# Patient Record
Sex: MALE | Race: WHITE | NOT HISPANIC OR LATINO | Employment: UNEMPLOYED | URBAN - METROPOLITAN AREA
[De-identification: names, ages, dates, MRNs, and addresses within clinical notes are randomized per-mention and may not be internally consistent; named-entity substitution may affect disease eponyms.]

---

## 2017-07-05 ENCOUNTER — APPOINTMENT (OUTPATIENT)
Dept: RADIOLOGY | Facility: MEDICAL CENTER | Age: 52
DRG: 175 | End: 2017-07-05
Attending: EMERGENCY MEDICINE
Payer: COMMERCIAL

## 2017-07-05 ENCOUNTER — HOSPITAL ENCOUNTER (INPATIENT)
Facility: MEDICAL CENTER | Age: 52
LOS: 4 days | DRG: 175 | End: 2017-07-09
Attending: EMERGENCY MEDICINE | Admitting: INTERNAL MEDICINE
Payer: COMMERCIAL

## 2017-07-05 ENCOUNTER — HOSPITAL ENCOUNTER (OUTPATIENT)
Dept: RADIOLOGY | Facility: MEDICAL CENTER | Age: 52
End: 2017-07-05

## 2017-07-05 ENCOUNTER — APPOINTMENT (OUTPATIENT)
Dept: RADIOLOGY | Facility: MEDICAL CENTER | Age: 52
DRG: 175 | End: 2017-07-05
Attending: INTERNAL MEDICINE
Payer: COMMERCIAL

## 2017-07-05 ENCOUNTER — RESOLUTE PROFESSIONAL BILLING HOSPITAL PROF FEE (OUTPATIENT)
Dept: MEDSURG UNIT | Facility: MEDICAL CENTER | Age: 52
End: 2017-07-05
Payer: COMMERCIAL

## 2017-07-05 DIAGNOSIS — I26.09 OTHER PULMONARY EMBOLISM WITH ACUTE COR PULMONALE, UNSPECIFIED CHRONICITY (HCC): ICD-10-CM

## 2017-07-05 DIAGNOSIS — I21.A1: ICD-10-CM

## 2017-07-05 DIAGNOSIS — J96.01 ACUTE HYPOXEMIC RESPIRATORY FAILURE (HCC): ICD-10-CM

## 2017-07-05 DIAGNOSIS — N17.9 AKI (ACUTE KIDNEY INJURY) (HCC): ICD-10-CM

## 2017-07-05 DIAGNOSIS — I26.09 OTHER ACUTE PULMONARY EMBOLISM WITH ACUTE COR PULMONALE (HCC): ICD-10-CM

## 2017-07-05 DIAGNOSIS — E87.6 HYPOKALEMIA: ICD-10-CM

## 2017-07-05 PROBLEM — I26.99 PULMONARY EMBOLISM (HCC): Status: ACTIVE | Noted: 2017-07-05

## 2017-07-05 LAB
ABO GROUP BLD: NORMAL
APTT PPP: 25.3 SEC (ref 24.7–36)
BLD GP AB SCN SERPL QL: NORMAL
BNP SERPL-MCNC: 184 PG/ML (ref 0–100)
INR PPP: 1.12 (ref 0.87–1.13)
PROTHROMBIN TIME: 14.8 SEC (ref 12–14.6)
RH BLD: NORMAL
TROPONIN I SERPL-MCNC: 0.69 NG/ML (ref 0–0.04)

## 2017-07-05 PROCEDURE — 700117 HCHG RX CONTRAST REV CODE 255: Performed by: EMERGENCY MEDICINE

## 2017-07-05 PROCEDURE — 94760 N-INVAS EAR/PLS OXIMETRY 1: CPT

## 2017-07-05 PROCEDURE — 700105 HCHG RX REV CODE 258: Performed by: EMERGENCY MEDICINE

## 2017-07-05 PROCEDURE — 71275 CT ANGIOGRAPHY CHEST: CPT

## 2017-07-05 PROCEDURE — 86850 RBC ANTIBODY SCREEN: CPT

## 2017-07-05 PROCEDURE — 83880 ASSAY OF NATRIURETIC PEPTIDE: CPT

## 2017-07-05 PROCEDURE — 37212 THROMBOLYTIC VENOUS THERAPY: CPT

## 2017-07-05 PROCEDURE — 700111 HCHG RX REV CODE 636 W/ 250 OVERRIDE (IP): Performed by: EMERGENCY MEDICINE

## 2017-07-05 PROCEDURE — 84484 ASSAY OF TROPONIN QUANT: CPT | Mod: 91

## 2017-07-05 PROCEDURE — 85730 THROMBOPLASTIN TIME PARTIAL: CPT

## 2017-07-05 PROCEDURE — 96376 TX/PRO/DX INJ SAME DRUG ADON: CPT

## 2017-07-05 PROCEDURE — 770022 HCHG ROOM/CARE - ICU (200)

## 2017-07-05 PROCEDURE — 93005 ELECTROCARDIOGRAM TRACING: CPT | Performed by: EMERGENCY MEDICINE

## 2017-07-05 PROCEDURE — 73564 X-RAY EXAM KNEE 4 OR MORE: CPT | Mod: RT

## 2017-07-05 PROCEDURE — 85610 PROTHROMBIN TIME: CPT

## 2017-07-05 PROCEDURE — 99291 CRITICAL CARE FIRST HOUR: CPT

## 2017-07-05 PROCEDURE — 96374 THER/PROPH/DIAG INJ IV PUSH: CPT

## 2017-07-05 PROCEDURE — 700105 HCHG RX REV CODE 258: Performed by: INTERNAL MEDICINE

## 2017-07-05 PROCEDURE — 86901 BLOOD TYPING SEROLOGIC RH(D): CPT

## 2017-07-05 PROCEDURE — 700111 HCHG RX REV CODE 636 W/ 250 OVERRIDE (IP): Performed by: INTERNAL MEDICINE

## 2017-07-05 PROCEDURE — 3E03317 INTRODUCTION OF OTHER THROMBOLYTIC INTO PERIPHERAL VEIN, PERCUTANEOUS APPROACH: ICD-10-PCS | Performed by: EMERGENCY MEDICINE

## 2017-07-05 PROCEDURE — 96361 HYDRATE IV INFUSION ADD-ON: CPT

## 2017-07-05 PROCEDURE — 96375 TX/PRO/DX INJ NEW DRUG ADDON: CPT

## 2017-07-05 PROCEDURE — 86900 BLOOD TYPING SEROLOGIC ABO: CPT

## 2017-07-05 RX ORDER — ENALAPRILAT 1.25 MG/ML
1.25 INJECTION INTRAVENOUS EVERY 6 HOURS PRN
Status: DISCONTINUED | OUTPATIENT
Start: 2017-07-05 | End: 2017-07-09 | Stop reason: HOSPADM

## 2017-07-05 RX ORDER — PROMETHAZINE HYDROCHLORIDE 25 MG/1
12.5-25 TABLET ORAL EVERY 4 HOURS PRN
Status: DISCONTINUED | OUTPATIENT
Start: 2017-07-05 | End: 2017-07-09 | Stop reason: HOSPADM

## 2017-07-05 RX ORDER — SODIUM CHLORIDE 9 MG/ML
INJECTION, SOLUTION INTRAVENOUS CONTINUOUS
Status: DISCONTINUED | OUTPATIENT
Start: 2017-07-05 | End: 2017-07-06

## 2017-07-05 RX ORDER — POLYETHYLENE GLYCOL 3350 17 G/17G
1 POWDER, FOR SOLUTION ORAL
Status: DISCONTINUED | OUTPATIENT
Start: 2017-07-05 | End: 2017-07-09 | Stop reason: HOSPADM

## 2017-07-05 RX ORDER — MORPHINE SULFATE 4 MG/ML
1 INJECTION, SOLUTION INTRAMUSCULAR; INTRAVENOUS ONCE
Status: COMPLETED | OUTPATIENT
Start: 2017-07-05 | End: 2017-07-05

## 2017-07-05 RX ORDER — ACETAMINOPHEN 325 MG/1
650 TABLET ORAL EVERY 6 HOURS PRN
Status: DISCONTINUED | OUTPATIENT
Start: 2017-07-05 | End: 2017-07-09 | Stop reason: HOSPADM

## 2017-07-05 RX ORDER — AMOXICILLIN 250 MG
2 CAPSULE ORAL 2 TIMES DAILY
Status: DISCONTINUED | OUTPATIENT
Start: 2017-07-06 | End: 2017-07-09 | Stop reason: HOSPADM

## 2017-07-05 RX ORDER — PROMETHAZINE HYDROCHLORIDE 25 MG/1
12.5-25 SUPPOSITORY RECTAL EVERY 4 HOURS PRN
Status: DISCONTINUED | OUTPATIENT
Start: 2017-07-05 | End: 2017-07-09 | Stop reason: HOSPADM

## 2017-07-05 RX ORDER — HEPARIN SODIUM 1000 [USP'U]/ML
4000 INJECTION, SOLUTION INTRAVENOUS; SUBCUTANEOUS PRN
Status: DISCONTINUED | OUTPATIENT
Start: 2017-07-05 | End: 2017-07-07

## 2017-07-05 RX ORDER — SODIUM CHLORIDE 9 MG/ML
1000 INJECTION, SOLUTION INTRAVENOUS ONCE
Status: COMPLETED | OUTPATIENT
Start: 2017-07-05 | End: 2017-07-05

## 2017-07-05 RX ORDER — BISACODYL 10 MG
10 SUPPOSITORY, RECTAL RECTAL
Status: DISCONTINUED | OUTPATIENT
Start: 2017-07-05 | End: 2017-07-09 | Stop reason: HOSPADM

## 2017-07-05 RX ORDER — OXYCODONE HYDROCHLORIDE 5 MG/1
5 TABLET ORAL EVERY 4 HOURS PRN
Status: DISCONTINUED | OUTPATIENT
Start: 2017-07-05 | End: 2017-07-09 | Stop reason: HOSPADM

## 2017-07-05 RX ORDER — MORPHINE SULFATE 4 MG/ML
2-4 INJECTION, SOLUTION INTRAMUSCULAR; INTRAVENOUS EVERY 4 HOURS PRN
Status: DISCONTINUED | OUTPATIENT
Start: 2017-07-05 | End: 2017-07-07

## 2017-07-05 RX ORDER — ONDANSETRON 2 MG/ML
4 INJECTION INTRAMUSCULAR; INTRAVENOUS ONCE
Status: COMPLETED | OUTPATIENT
Start: 2017-07-05 | End: 2017-07-05

## 2017-07-05 RX ORDER — GUAIFENESIN/DEXTROMETHORPHAN 100-10MG/5
10 SYRUP ORAL EVERY 6 HOURS PRN
Status: DISCONTINUED | OUTPATIENT
Start: 2017-07-05 | End: 2017-07-09 | Stop reason: HOSPADM

## 2017-07-05 RX ORDER — IPRATROPIUM BROMIDE AND ALBUTEROL SULFATE 2.5; .5 MG/3ML; MG/3ML
3 SOLUTION RESPIRATORY (INHALATION)
Status: DISCONTINUED | OUTPATIENT
Start: 2017-07-05 | End: 2017-07-09 | Stop reason: HOSPADM

## 2017-07-05 RX ORDER — SODIUM CHLORIDE 9 MG/ML
50 INJECTION, SOLUTION INTRAVENOUS CONTINUOUS
Status: ACTIVE | OUTPATIENT
Start: 2017-07-05 | End: 2017-07-05

## 2017-07-05 RX ORDER — ONDANSETRON 4 MG/1
4 TABLET, ORALLY DISINTEGRATING ORAL EVERY 4 HOURS PRN
Status: DISCONTINUED | OUTPATIENT
Start: 2017-07-05 | End: 2017-07-09 | Stop reason: HOSPADM

## 2017-07-05 RX ORDER — OXYCODONE HYDROCHLORIDE 10 MG/1
10 TABLET ORAL EVERY 4 HOURS PRN
Status: DISCONTINUED | OUTPATIENT
Start: 2017-07-05 | End: 2017-07-09 | Stop reason: HOSPADM

## 2017-07-05 RX ORDER — ONDANSETRON 2 MG/ML
4 INJECTION INTRAMUSCULAR; INTRAVENOUS EVERY 4 HOURS PRN
Status: DISCONTINUED | OUTPATIENT
Start: 2017-07-05 | End: 2017-07-09 | Stop reason: HOSPADM

## 2017-07-05 RX ADMIN — ALTEPLASE 100 MG: KIT at 17:18

## 2017-07-05 RX ADMIN — SODIUM CHLORIDE: 9 INJECTION, SOLUTION INTRAVENOUS at 19:20

## 2017-07-05 RX ADMIN — IOHEXOL 100 ML: 350 INJECTION, SOLUTION INTRAVENOUS at 16:25

## 2017-07-05 RX ADMIN — HEPARIN SODIUM 1000 UNITS/HR: 5000 INJECTION, SOLUTION INTRAVENOUS at 19:57

## 2017-07-05 RX ADMIN — SODIUM CHLORIDE 1000 ML: 9 INJECTION, SOLUTION INTRAVENOUS at 15:30

## 2017-07-05 RX ADMIN — ONDANSETRON 4 MG: 2 INJECTION INTRAMUSCULAR; INTRAVENOUS at 17:25

## 2017-07-05 RX ADMIN — MORPHINE SULFATE 4 MG: 4 INJECTION INTRAVENOUS at 23:16

## 2017-07-05 RX ADMIN — HEPARIN SODIUM 4000 UNITS: 1000 INJECTION, SOLUTION INTRAVENOUS; SUBCUTANEOUS at 19:55

## 2017-07-05 RX ADMIN — MORPHINE SULFATE 1 MG: 4 INJECTION INTRAVENOUS at 17:25

## 2017-07-05 RX ADMIN — MORPHINE SULFATE 3 MG: 4 INJECTION INTRAVENOUS at 18:24

## 2017-07-05 RX ADMIN — SODIUM CHLORIDE 50 ML: 9 INJECTION, SOLUTION INTRAVENOUS at 19:20

## 2017-07-05 ASSESSMENT — PAIN SCALES - GENERAL
PAINLEVEL_OUTOF10: 3
PAINLEVEL_OUTOF10: 3
PAINLEVEL_OUTOF10: 6

## 2017-07-05 ASSESSMENT — ENCOUNTER SYMPTOMS
WHEEZING: 0
ABDOMINAL PAIN: 0
NAUSEA: 1
DIZZINESS: 0
COUGH: 1
CONSTIPATION: 0
SPUTUM PRODUCTION: 1
SHORTNESS OF BREATH: 1
FEVER: 0
DIARRHEA: 0
LOSS OF CONSCIOUSNESS: 0
HEADACHES: 0
FOCAL WEAKNESS: 0
BLURRED VISION: 0
CHILLS: 0
VOMITING: 0

## 2017-07-05 ASSESSMENT — COPD QUESTIONNAIRES
DO YOU EVER COUGH UP ANY MUCUS OR PHLEGM?: YES, A FEW DAYS A WEEK OR MONTH
HAVE YOU SMOKED AT LEAST 100 CIGARETTES IN YOUR ENTIRE LIFE: YES
COPD SCREENING SCORE: 5
DURING THE PAST 4 WEEKS HOW MUCH DID YOU FEEL SHORT OF BREATH: SOME OF THE TIME

## 2017-07-05 ASSESSMENT — LIFESTYLE VARIABLES
SUBSTANCE_ABUSE: 0
EVER_SMOKED: YES

## 2017-07-05 NOTE — ED NOTES
Pt was up to BS and had a BM. O2 delivery switched to oxymask at 10L because Pt is breathing mostly through his mouth. To CT via [place] at this time.

## 2017-07-05 NOTE — IP AVS SNAPSHOT
7/9/2017    Thony Crow   [address]  [place] [address]    Dear Thony:    [facility] wants to ensure your discharge home is safe and you or your loved ones have had all of your questions answered regarding your care after you leave the hospital.    Below is a list of resources and contact information should you have any questions regarding your hospital stay, follow-up instructions, or active medical symptoms.    Questions or Concerns Regarding… Contact   Medical Questions Related to Your Discharge  (7 days a week, 8am-5pm) Contact a Nurse Care Coordinator   [phone]   Medical Questions Not Related to Your Discharge  (24 hours a day / 7 days a week)  Contact the Nurse Health Line   [phone]    Medications or Discharge Instructions Refer to your discharge packet   or contact your [facility] Primary Care Provider   [phone]   Follow-up Appointment(s) Schedule your appointment via [employer]   or contact Scheduling [phone]   Billing Review your statement via [employer]  or contact Billing [phone]   Medical Records Review your records via [employer]   or contact Medical Records [phone]     You may receive a telephone call within two days of discharge. This call is to make certain you understand your discharge instructions and have the opportunity to have any questions answered. You can also easily access your medical information, test results and upcoming appointments via the [employer] free online health management tool. You can learn more and sign up at [employer]. For assistance setting up your [employer] account, please call [phone].    Once again, we want to ensure your discharge home is safe and that you have a clear understanding of any next steps in your care. If you have any questions or concerns, please do not hesitate to contact us, we are here for you. Thank you for choosing [facility] for your healthcare needs.    Sincerely,    Your [facility] Healthcare Team

## 2017-07-05 NOTE — H&P
[facility] Medicine Admitting History and Physical    Name Thony Crow     1965   Age/Sex 52 y.o. male   MRN [id number]   Code Status FULL     After 5PM or if no immediate response to page, please call for cross-coverage  Attending/Team: Dr. Brady/[employer] Text to page 6AM-5PM  Call [phone] to page after hours   1st Call - Day Sr. Resident (R2/R3):   KYLAH Hernandez 2nd Call - Day Sr. Resident (R2/R3):   BANG Peng         Chief Complaint:  Chest pain    HPI:  Mr. Crow is a 53 y/o male with no prior PMHx who presented to [facility] with complaints of sudden onset chest pain at 10 AM today.  He has a h/o remote R ACL repair and then re-tearing it 20 years ago, and last week he injured the same knee while lifting something heavy and was immobilized due to pain.  The next day he noted swelling of his entire RLE from thigh to foot, and continued to be bedridden due to this.  For the past 2 days, the swelling has been decreasing, and yesterday the patient was able to go camping.  This morning when he was packing up his stuff, he developed sudden onset of diaphoresis and substernal chest pressure with extreme dyspnea and dizziness and called 911 when this did not relieve after a few minutes.  He was taken to [facility] where his trop was noted to be 0.9 and he was hypoxic requiring 15 and then later 8-10 L/min O2.  He was given  mg and 2 nitro tabs, but then his BP dropped, so he was transferred here.  Both outside EKG and EKG here showed S1Q3T3 pattern on EKG, and CTPE here showed bilateral diffuse PE.  He was given full dose tPA in ER and is being admitted to ICU.  Patient states his pain is down from 8/10 to about 4-5/10 at this point.    Review of Systems   Constitutional: Positive for malaise/fatigue. Negative for fever and chills.   Eyes: Negative for blurred vision.   Respiratory: Positive for cough, sputum production and shortness of  breath. Negative for wheezing.    Cardiovascular: Positive for chest pain.   Gastrointestinal: Positive for nausea. Negative for vomiting, abdominal pain, diarrhea and constipation.   Genitourinary: Negative for dysuria.   Musculoskeletal:        + right knee pain   Neurological: Negative for dizziness, focal weakness, loss of consciousness and headaches.   Psychiatric/Behavioral: Negative for substance abuse.   All other systems reviewed and are negative.            Past Medical History:   History reviewed. No pertinent past medical history.    Past Surgical History:  Past Surgical History   Procedure Laterality Date   • Other orthopedic surgery  1991     ACL repair R knee    • Other orthopedic surgery  2010     meniscus repair L knee       Current Outpatient Medications:  Home Medications     Reviewed by Terrence Alcocer (Pharmacy Tech) on 07/05/17 at 1658  Med List Status: Complete    Medication Last Dose Status          Patient Abel Taking any Medications                        Medication Allergy/Sensitivities:  No Known Allergies    Family History:  Family History   Problem Relation Age of Onset   • Diabetes Father        Social History:  Social History     Social History   • Marital Status: Single     Spouse Name: N/A   • Number of Children: N/A   • Years of Education: N/A     Occupational History   • Not on file.     Social History Main Topics   • Smoking status: Current Some Day Smoker     Types: Cigars   • Smokeless tobacco: Not on file   • Alcohol Use: No      Comment: rarely   • Drug Use: Yes     Special: Inhaled      Comment: regular marijuana use   • Sexual Activity: Not on file     Other Topics Concern   • Not on file     Social History Narrative   • No narrative on file       Living situation: lives in [place]  PCP : none       Physical Exam   Filed Vitals:    07/05/17 1718 07/05/17 1733 07/05/17 1748 07/05/17 1803   Pulse: 94 88 98 83   Temp:       Resp:       Height:       Weight:       SpO2:  "98% 97% 98% 97%     Body mass index is 32.66 kg/(m^2).  Pulse 83  Temp(Src) 37.3 °C (99.1 °F)  Resp 28  Ht 2.007 m (6' 7\")  Wt 131.543 kg (290 lb)  BMI 32.66 kg/m2  SpO2 97%  O2 therapy: Pulse Oximetry: 97 %, O2 (LPM): 8, O2 Delivery: Oxymask    Physical Exam   Constitutional: He is oriented to person, place, and time and well-developed, well-nourished, and in no distress.   HENT:   Head: Normocephalic and atraumatic.   Mouth/Throat: Oropharynx is clear and moist.   Eyes: EOM are normal. Pupils are equal, round, and reactive to light.   Neck: Normal range of motion. Neck supple. No JVD present. No tracheal deviation present.   Cardiovascular: Normal rate, regular rhythm, normal heart sounds and intact distal pulses.  Exam reveals no gallop and no friction rub.    No murmur heard.  Pulmonary/Chest: He is in respiratory distress. He has wheezes. He has no rales.   Abdominal: Soft. Bowel sounds are normal. He exhibits no distension. There is no tenderness.   Musculoskeletal: Normal range of motion.   Mild RLE edema. + tenderness over R knee   Neurological: He is alert and oriented to person, place, and time. GCS score is 15.   No focal deficits   Skin: Skin is warm and dry. No rash noted. No erythema.   Psychiatric: Mood and affect normal.   Nursing note and vitals reviewed.         Data Review       Old Records Request:   Completed  Current Records review and summary: Completed    Outside Labs:  Trop 0.9  CMP wnl aside from creat of 1.39 and glucose 139  EKG sinus tachycardia with S1Q3T3  CXR shows pulmonary congestion    Lab Data Review:  Recent Results (from the past 24 hour(s))   EKG (ER)    Collection Time: 07/05/17  3:17 PM   Result Value Ref Range    Report       [facility] Emergency Dept.    Test Date:  2017-07-05  Pt Name:    CAROL LOPEZ                 Department: ER  MRN:        [id number]                      Room:        16  Gender:     M                            Technician: " [id number]  :        1965                   Requested By:CIELO OLIVARES  Order #:    [id number]                    Reading MD:    Measurements  Intervals                                Axis  Rate:       87                           P:          71  MD:         168                          QRS:        29  QRSD:       112                          T:          -15  QT:         388  QTc:        467    Interpretive Statements  SINUS RHYTHM  NONSPECIFIC INTRAVENTRICULAR CONDUCTION DELAY  No previous ECG available for comparison     TROPONIN    Collection Time: 17  3:23 PM   Result Value Ref Range    Troponin I 0.69 (H) 0.00 - 0.04 ng/mL   BTYPE NATRIURETIC PEPTIDE    Collection Time: 17  3:23 PM   Result Value Ref Range    B Natriuretic Peptide 184 (H) 0 - 100 pg/mL   Prothrombin Time    Collection Time: 17  3:23 PM   Result Value Ref Range    PT 14.8 (H) 12.0 - 14.6 sec    INR 1.12 0.87 - 1.13   APTT    Collection Time: 17  3:23 PM   Result Value Ref Range    APTT 25.3 24.7 - 36.0 sec       Imaging/Procedures Review:    Independant Imaging Review: Completed  CT-CTA CHEST PULMONARY ARTERY W/ RECONS   Final Result   Addendum 1 of 1   These findings were discussed with CIELO OLIVARES on 2017 4:41 PM.      Final      1.  RIGHT ventricular strain extensive acute bilateral pulmonary emboli with findings raising concern for RIGHT ventricular strain.   2.  No pneumonia or pneumothorax.               OUTSIDE IMAGES-DX CHEST   Preliminary Result      LE Venous Duplex-DVT ([facility] only)    (Results Pending)   ECHOCARDIOGRAM-COMP W/ CONT    (Results Pending)   DX-KNEE COMPLETE 4+ RIGHT    (Results Pending)        EKG:   EKG Independant Review: Completed  QTc:467 ms, HR: 87 bpm, Normal Sinus Rhythm with S1Q3T3 changes noted.    (y) Records reviewed and summarized in current documentation       Assessment/Plan     * Pulmonary embolism (CMS-HCC) (present on admission)  Assessment &  Plan  pt presented with sudden onset chest pain to outside facility; has h/o recent immobility after ACL injury with h/o LE pain and swelling which resolved recently; EKG shows S1Q3T3 changes; CXR from outside facility shows mild pulmonary congestion; ; CTPE shows extensive acute b/l PEs with e/o RV strain; full dose tPA administered in ER; will admit to ICU for monitoring post-tPA; LE duplex ordered to evaluate for DVT; start heparin gtt post-tpa and will need to transition to oral anticoagulation 24 hours post-tPA    Non-ST elevation myocardial infarction (NSTEMI) due to mismatch of myocardial oxygen supply and demand (CMS-HCC) (present on admission)  Assessment & Plan  2/2 demand ischemia from PE; trop trended up from outside facility; EKG shows NSR with S1Q3T3; will trend trop to monitor for improvement with treatment of PE    Acute hypoxemic respiratory failure (CMS-HCC) (present on admission)  Assessment & Plan  2/2 PE; wheezing noted on exam; continue O2 by oxymask; RT protocol; Q6H duonebs    MARJAN (acute kidney injury) (CMS-HCC) (present on admission)  Assessment & Plan  Creat 1.39 in [place]; likely prerenal; will hydrate    Hypokalemia (present on admission)  Assessment & Plan  K 3.8 in [place]; replete prn        Anticipated Hospital stay:  >2 midnights      Quality Measures  EKG reviewed, Labs reviewed, Medications reviewed and Radiology images reviewed  Sanchez catheter: No Sanchez      DVT Prophylaxis: Contraindicated - High bleeding risk  DVT prophylaxis - mechanical: Contra-indicated  Ulcer prophylaxis: Not indicated

## 2017-07-05 NOTE — IP AVS SNAPSHOT
[employer] Access Code: [email or web address]  Expires: 8/8/2017  2:31 PM    Your email address is not on file at [facility].  Email Addresses are required for you to sign up for [employer], please contact [phone] to verify your personal information and to provide your email address prior to attempting to register for [employer].    Thony BINA Tristin   [address]  [place] [address]    [employer]  A secure, online tool to manage your health information     [employer]’s [employer]® is a secure, online tool that connects you to your personalized health information from the privacy of your home -- day or night - making it very easy for you to manage your healthcare. Once the activation process is completed, you can even access your medical information using the [employer] charity, which is available for free in the [employer] store or Google Play store.     To learn more about [employer], visit [email or web address]    There are two levels of access available (as shown below):   My Chart Features  [facility] Primary Care Doctor [facility]  Specialists [facility]  Urgent  Care Non-[facility] Primary Care Doctor   Email your healthcare team securely and privately 24/7 X X X    Manage appointments: schedule your next appointment; view details of past/upcoming appointments X      Request prescription refills. X      View recent personal medical records, including lab and immunizations X X X X   View health record, including health history, allergies, medications X X X X   Read reports about your outpatient visits, procedures, consult and ER notes X X X X   See your discharge summary, which is a recap of your hospital and/or ER visit that includes your diagnosis, lab results, and care plan X X  X     How to register for [employer]:  Once your e-mail address has been verified, follow the following steps to sign up for [employer].     1. Go to  [email or web address]  2. Click on the Sign Up Now box, which takes you to the New Member Sign Up page. You  will need to provide the following information:  a. Enter your [employer] Access Code exactly as it appears at the top of this page. (You will not need to use this code after you’ve completed the sign-up process. If you do not sign up before the expiration date, you must request a new code.)   b. Enter your date of birth.   c. Enter your home email address.   d. Click Submit, and follow the next screen’s instructions.  3. Create a [employer] ID. This will be your [employer] login ID and cannot be changed, so think of one that is secure and easy to remember.  4. Create a [employer] password. You can change your password at any time.  5. Enter your Password Reset Question and Answer. This can be used at a later time if you forget your password.   6. Enter your e-mail address. This allows you to receive e-mail notifications when new information is available in [employer].  7. Click Sign Up. You can now view your health information.    For assistance activating your [employer] account, call [phone]

## 2017-07-05 NOTE — IP AVS SNAPSHOT
" Home Care Instructions                                                                                                                  Name:Thony Crow  Medical Record Number:[id number]  CSN: [id number]    YOB: 1965   Age: 52 y.o.  Sex: male  HT:2.007 m (6' 7.02\") WT: 134.6 kg (296 lb 11.8 oz)          Admit Date: 7/5/2017     Discharge Date:   Today's Date: 7/9/2017  Attending Doctor:  John Welch M.D.                  Allergies:  Review of patient's allergies indicates no known allergies.            Discharge Instructions       Discharge Instructions    Discharged to home by car with self. Discharged via wheelchair, hospital escort: Yes.  Special equipment needed: Not Applicable    Be sure to schedule a follow-up appointment with your primary care doctor or any specialists as instructed.     Discharge Plan:   Diet Plan: Discussed  Activity Level: Discussed  Confirmed Follow up Appointment: Patient to Call and Schedule Appointment  Confirmed Symptoms Management: Discussed  Medication Reconciliation Updated: Yes  Influenza Vaccine Indication: Indicated: Not available from distributor/    I understand that a diet low in cholesterol, fat, and sodium is recommended for good health. Unless I have been given specific instructions below for another diet, I accept this instruction as my diet prescription.   Other diet: regular    Special Instructions:   Rivaroxaban oral tablets  What is this medicine?  RIVAROXABAN (ri va LACY a ban) is an anticoagulant (blood thinner). It is used to treat blood clots in the lungs or in the veins. It is also used after knee or hip surgeries to prevent blood clots. It is also used to lower the chance of stroke in people with a medical condition called atrial fibrillation.  This medicine may be used for other purposes; ask your health care provider or pharmacist if you have questions.  COMMON BRAND NAME(S): Xarelto  What should I tell my health care provider before " I take this medicine?  They need to know if you have any of these conditions:  -bleeding disorders  -bleeding in the brain  -blood in your stools (black or tarry stools) or if you have blood in your vomit  -history of stomach bleeding  -kidney disease  -liver disease  -low blood counts, like low white cell, platelet, or red cell counts  -recent or planned spinal or epidural procedure  -take medicines that treat or prevent blood clots  -an unusual or allergic reaction to rivaroxaban, other medicines, foods, dyes, or preservatives  -pregnant or trying to get pregnant  -breast-feeding  How should I use this medicine?  Take this medicine by mouth with a glass of water. Follow the directions on the prescription label. Take your medicine at regular intervals. Do not take it more often than directed. Do not stop taking except on your doctor's advice.  If you are taking this medicine after hip or knee replacement surgery, take it with or without food.  If you are taking this medicine for atrial fibrillation, take it with your evening meal. If you are taking this medicine to treat blood clots, take it with food at the same time each day. If you are unable to swallow your tablet, you may crush the tablet and mix it in applesauce. Then, immediately eat the applesauce. You should eat more food right after you eat the applesauce containing the crushed tablet.  Talk to your pediatrician regarding the use of this medicine in children. Special care may be needed.  Overdosage: If you think you've taken too much of this medicine contact a poison control center or emergency room at once.  Overdosage: If you think you have taken too much of this medicine contact a poison control center or emergency room at once.  NOTE: This medicine is only for you. Do not share this medicine with others.  What if I miss a dose?  If you take your medicine once a day and miss a dose, take the missed dose as soon as you remember. If you take your  medicine twice a day and miss a dose, take the missed dose immediately. In this instance, 2 tablets may be taken at the same time. The next day you should take 1 tablet twice a day as directed.  What may interact with this medicine?  -aspirin and aspirin-like medicines  -certain antibiotics like erythromycin, azithromycin, and clarithromycin  -certain medicines for fungal infections like ketoconazole and itraconazole  -certain medicines for irregular heart beat like amiodarone, quinidine, dronedarone  -certain medicines for seizures like carbamazepine, phenytoin  -certain medicines that treat or prevent blood clots like warfarin, enoxaparin, and dalteparin   -conivaptan  -diltiazem  -felodipine  -indinavir  -lopinavir; ritonavir  -NSAIDS, medicines for pain and inflammation, like ibuprofen or naproxen  -ranolazine  -rifampin  -ritonavir  -Imke's wort  -verapamil  This list may not describe all possible interactions. Give your health care provider a list of all the medicines, herbs, non-prescription drugs, or dietary supplements you use. Also tell them if you smoke, drink alcohol, or use illegal drugs. Some items may interact with your medicine.  What should I watch for while using this medicine?  Do not stop taking this medicine without first talking to your doctor. Stopping this medicine may increase your risk of having a stroke. Be sure to refill your prescription before you run out of medicine.  This medicine may increase your risk to bruise or bleed. Call your doctor or health care professional if you notice any unusual bleeding.  Be careful brushing and flossing your teeth or using a toothpick because you may bleed more easily. If you have any dental work done, tell your dentist you are receiving this medicine.  What side effects may I notice from receiving this medicine?  Side effects that you should report to your doctor or health care professional as soon as possible:  -allergic reactions like skin rash,  itching or hives, swelling of the face, lips, or tongue  -back pain  -bloody or black, tarry stools  -changes in vision  -confusion, trouble speaking or understanding  -red or dark-brown urine  -redness, blistering, peeling or loosening of the skin, including inside the mouth  -severe headaches  -spitting up blood or brown material that looks like coffee grounds  -sudden numbness or weakness of the face, arm or leg  -trouble walking, dizziness, loss of balance or coordination  -unusual bruising or bleeding from the eye, gums, or nose   Side effects that usually do not require medical attention (Report these to your doctor or health care professional if they continue or are bothersome.):  -dizziness  -muscle pain  This list may not describe all possible side effects. Call your doctor for medical advice about side effects. You may report side effects to FDA at 9-363-FDA-5342.  Where should I keep my medicine?  Keep out of the reach of children.  Store at room temperature between 15 and 30 degrees C (59 and 86 degrees F). Throw away any unused medicine after the expiration date.      · Is patient Post Blood Transfusion?  No  Deep Vein Thrombosis Discharge Instructions    A deep vein thrombosis (DVT) is a blood clot (thrombus) that develops in a deep vein. A DVT is a clot in the deep, larger veins of the leg, arm, or pelvis. These are more dangerous than clots that might form in veins on the surface of the body. Deep vein thrombosis can lead to complications if the clot breaks off and travels in the bloodstream to the lungs.     CAUSES  Blood clots form in a vein for different reasons. Usually several things cause blood clots. They include:   · The flow of blood slows down.   · The inside of the vein is damaged in some way.   · The person has a condition that makes blood clot more easily. These conditions may include:  · Older age (especially over 75 years old).  · Having a history of blood clots.  · Having major or  lengthy surgery. Hip surgery is particularly high-risk.   · Breaking a hip or leg.  · Sitting or lying still for a long time.  · Cancer or cancer treatment.  · Having a long, thin tube (catheter) placed inside a vein during a medical procedure.   · Being overweight (obese).  · Pregnancy and childbirth.  · Medicines with estrogen.  · Smoking.  · Other circulation or heart problems.     SYMPTOMS  When a clot forms, it can either partially or totally block the blood flow in that vein. Symptoms of a DVT can include:  · Swelling of the leg or arm, especially if one side is much worse.  · Warmth and redness of the leg or arm, especially if one side is much worse.   · Pain in an arm or leg. If the clot is in the leg, symptoms may be more noticeable or worse when standing or walking.  If the blood clot travels to the lung, it may cause:  · Shortness of breath.  · Chest pain. The pain may be worsened by deep breaths.   · Coughing up thick mucus (phlegm), possibly flecked with blood.   Anyone with these symptoms should get emergency medical treatment right away. Call your local emergency  Services (911 in [place] if you have these symptoms.     DIAGNOSIS  If a DVT is suspected, your caregiver will take a full medical history. He or she will also perform a physical exam. Tests that also may be required include:   · Studies of the clotting properties of the blood.   · An ultrasound scan.   · X-rays to show the flow of blood when special dye is injected into the veins (venography).   · Studies of your lungs if you have any chest symptoms.     PREVENTION  · Exercise the legs regularly. Take a brisk 30 minute walk every day.   · Maintain a weight that is appropriate for your height.  · Avoid sitting or lying in bed for long periods of time without moving your legs.   · Women, particularly those over the age of 35, should consider the risks and benefits of taking estrogen medicine, including birth control pills.   · Do not smoke,  especially if you take estrogen medicines.   · Long-distance travel can increase your risk. You should exercise your legs by walking or pumping the muscles every hour.   · In hospital prevention: Prevention may include medical and non medical measures.     TREATMENT  · The most common treatment for DVT is blood thinning (anticoagulant) medicine, which reduces the blood's tendency to clot. Anticoagulants can stop new blood clots from forming and old ones from growing. They cannot dissolve existing clots. Your body does this by itself over time. Anticoagulants can be given by mouth, by intravenous (IV) access, or by injection. Your caregiver will determine the best program for you.   · Less commonly, clot-dissolving drugs (thrombolytics) are used to dissolve a DVT. They carry a high risk of bleeding, so they are used mainly in severe cases.   · Very rarely, a blood clot in the leg needs to be removed surgically.   · If you are unable to take anticoagulants, your caregiver may arrange for you to have a filter placed in a main vein in your belly (abdomen). This filter prevents clots from traveling to your lungs.     HOME CARE INSTRUCTIONS  Take all medicines prescribed by your caregiver. Follow the directions carefully.   · You will most likely continue taking anticoagulants after you leave the hospital. Your caregiver will advise you on the length of treatment (usually 3 to 5 months, sometimes for life).   · Taking too much or too little of an anticoagulant is dangerous. While taking this type of medicine, you will need to have regular blood tests to be sure the dose is correct. The dose can change for many reasons. It is critically important that you take this medicine exactly as prescribed, and that you have blood tests exactly as directed.   · Many foods can interfere with anticoagulants. These include foods high in vitamin K, such as spinach, kale, broccoli, cabbage, latha and turnip greens, [place] sprouts,  peas, cauliflower, seaweed, parsley, beef and pork liver, green tea, and soybean oil. Your caregiver should discuss limits on these foods with you or you should arrange a visit with a dietician to answer your questions.   · Many medicines can interfere with anticoagulants. You must tell your caregiver about any and all medicines you take. This includes all vitamins and supplements. Be especially cautious with aspirin and anti-inflammatory medicines. Ask your caregiver before taking these.   · Anticoagulants can have side effects, mostly excessive bruising or bleeding. You will need to hold pressure over cuts for longer than usual. Avoid alcoholic drinks or consume only very small amounts while taking this medicine.    If you are taking an anticoagulant:  · Wear a medical alert bracelet.  · Notify your dentist or other caregivers before procedures.  · Avoid contact sports.    · Ask your caregiver how soon you can go back to normal activities. Not being active can lead to new clots. Ask for a list of what you should and should not do.   · Exercise your lower leg muscles. This is important while traveling.   · You may need to wear compression stockings. These are tight elastic stockings that apply pressure to the lower legs. This can help keep the blood in the legs from clotting.   · If you are a smoker, you should quit.   · Learn as much as you can about DVT.     SEEK MEDICAL CARE IF:  · You have unusual bruising or any bleeding problems.  · The swelling or pain in your affected arm or leg is not gradually improving.   · You anticipate surgery or long-distance travel. You should get specific advice on DVT prevention.   · You discover other family members with blood clots. This may require further testing for inherited diseases or conditions.     SEEK IMMEDIATE MEDICAL CARE IF:  · You develop chest pain.  · You develop severe shortness of breath.  · You begin to cough up bloody mucus or phlegm (sputum).  · You feel  dizzy or faint.   · You develop swelling or pain in the leg.  · You have breathing problems after traveling.    MAKE SURE YOU:  · Understand these instructions.  · Will watch your condition.  Will get help right away if you are not doing well or get worse.     Depression / Suicide Risk    As you are discharged from this [facility] facility, it is important to learn how to keep safe from harming yourself.    Recognize the warning signs:  · Abrupt changes in personality, positive or negative- including increase in energy   · Giving away possessions  · Change in eating patterns- significant weight changes-  positive or negative  · Change in sleeping patterns- unable to sleep or sleeping all the time   · Unwillingness or inability to communicate  · Depression  · Unusual sadness, discouragement and loneliness  · Talk of wanting to die  · Neglect of personal appearance   · Rebelliousness- reckless behavior  · Withdrawal from people/activities they love  · Confusion- inability to concentrate     If you or a loved one observes any of these behaviors or has concerns about self-harm, here's what you can do:  · Talk about it- your feelings and reasons for harming yourself  · Remove any means that you might use to hurt yourself (examples: pills, rope, extension cords, firearm)  · Get professional help from the community (Mental Health, Substance Abuse, psychological counseling)  · Do not be alone:Call your Safe Contact- someone whom you trust who will be there for you.  · Call your local CRISIS HOTLINE 608-7525 or 564-976-9397  · Call your local Children's Mobile Crisis Response Team [place] (310) 108-7100 or www.Firetide  · Call the toll free National Suicide Prevention Hotlines   · National Suicide Prevention Lifeline 708-752-PBMV (4099)  · National Hope Line Network 800-SUICIDE (235-8505)        Follow-up Information     1. Follow up with Osito Lopez M.D..    Specialty:  Internal Medicine    Why:  for  follow up    Contact information    [address]  [address]  [place] [address]  [phone]           Discharge Medication Instructions:    Below are the medications your physician expects you to take upon discharge:    Review all your home medications and newly ordered medications with your doctor and/or pharmacist. Follow medication instructions as directed by your doctor and/or pharmacist.    Please keep your medication list with you and share with your physician.               Medication List      START taking these medications        Instructions    Morning Afternoon Evening Bedtime    * rivaroxaban 15 MG Tabs tablet   Last time this was given:  15 mg on 7/9/2017  8:46 AM   Commonly known as:  XARELTO        Take 1 Tab by mouth 2 Times a Day for 18 days.   Dose:  15 mg                        * rivaroxaban 20 MG Tabs tablet   Start taking on:  7/28/2017   Last time this was given:  15 mg on 7/9/2017  8:46 AM   Commonly known as:  XARELTO        Take 1 Tab by mouth every day.   Dose:  20 mg                        * Notice:  This list has 2 medication(s) that are the same as other medications prescribed for you. Read the directions carefully, and ask your doctor or other care provider to review them with you.         Where to Get Your Medications      Information about where to get these medications is not yet available     ! Ask your nurse or doctor about these medications    - rivaroxaban 15 MG Tabs tablet  - rivaroxaban 20 MG Tabs tablet            Orders for after discharge     DME O2 New Set Up    Complete by:  As directed        DME O2 New Set Up    Complete by:  As directed        DME O2 New Set Up    Complete by:  As directed        REFERRAL TO HOME HEALTH    Complete by:  As directed    Home health will create and establish a plan of care             Instructions           Diet / Nutrition:    Follow any diet instructions given to you by your doctor or the dietician, including how much salt (sodium) you  are allowed each day.    If you are overweight, talk to your doctor about a weight reduction plan.    Activity:    Remain physically active following your doctor's instructions about exercise and activity.    Rest often.     Any time you become even a little tired or short of breath, SIT DOWN and rest.    Worsening Symptoms:    Report any of the following signs and symptoms to the doctor's office immediately:    *Pain of jaw, arm, or neck  *Chest pain not relieved by medication                               *Dizziness or loss of consciousness  *Difficulty breathing even when at rest   *More tired than usual                                       *Bleeding drainage or swelling of surgical site  *Swelling of feet, ankles, legs or stomach                 *Fever (>100ºF)  *Pink or blood tinged sputum  *Weight gain (3lbs/day or 5lbs /week)           *Shock from internal defibrillator (if applicable)  *Palpitations or irregular heartbeats                *Cool and/or numb extremities    Stroke Awareness    Common Risk Factors for Stroke include:    Age  Atrial Fibrillation  Carotid Artery Stenosis  Diabetes Mellitus  Excessive alcohol consumption  High blood pressure  Overweight   Physical inactivity  Smoking    Warning signs and symptoms of a stroke include:    *Sudden numbness or weakness of the face, arm or leg (especially on one side of the body).  *Sudden confusion, trouble speaking or understanding.  *Sudden trouble seeing in one or both eyes.  *Sudden trouble walking, dizziness, loss of balance or coordination.Sudden severe headache with no known cause.    It is very important to get treatment quickly when a stroke occurs. If you experience any of the above warning signs, call 911 immediately.                   Disclaimer         Quit Smoking / Tobacco Use:    I understand the use of any tobacco products increases my chance of suffering from future heart disease or stroke and could cause other illnesses which may  shorten my life. Quitting the use of tobacco products is the single most important thing I can do to improve my health. For further information on smoking / tobacco cessation call a Toll Free Quit Line at 1-611.158.6693 (*National Cancer [place]) or 1-461.539.6425 (American Lung Association) or you can access the web based program at www.lungusa.org.    [place] Tobacco Users Help Line:  (705) 566-6802       Toll Free: 1-491.461.9128  Quit Tobacco Program [facility] (320)425-2107    Crisis Hotline:    [place] Crisis Hotline:  7-718-RZHFWDY or 1-434.718.3925    [place] Crisis Hotline:    1-144.231.4629 or 142-446-2955    Discharge Survey:   Thank you for choosing [facility]. We hope we did everything we could to make your hospital stay a pleasant one. You may be receiving a phone survey and we would appreciate your time and participation in answering the questions. Your input is very valuable to us in our efforts to improve our service to our patients and their families.        My signature on this form indicates that:    1. I have reviewed and understand the above information.  2. My questions regarding this information have been answered to my satisfaction.  3. I have formulated a plan with my discharge nurse to obtain my prescribed medications for home.                  Disclaimer         __________________________________                     __________       ________                       Patient Signature                                                 Date                    Time

## 2017-07-05 NOTE — IP AVS SNAPSHOT
" <p align=\"LEFT\"><IMG SRC=\"[email or web address]\" alt=\"Image\" WIDTH=\"50%\" HEIGHT=\"200\" BORDER=\"\"></p>                   Name:Thony Crow  Medical Record Number:[id number]  CSN: [id number]    YOB: 1965   Age: 52 y.o.  Sex: male  HT:2.007 m (6' 7.02\") WT: 134.6 kg (296 lb 11.8 oz)          Admit Date: 7/5/2017     Discharge Date:   Today's Date: 7/9/2017  Attending Doctor:  Jhon Welch M.D.                  Allergies:  Review of patient's allergies indicates no known allergies.          Follow-up Information     1. Follow up with Osito Lopez M.D..    Specialty:  Internal Medicine    Why:  for follow up    Contact information    [address] [address]  [phone]           Medication List      Take these Medications        Instructions    * rivaroxaban 15 MG Tabs tablet   Commonly known as:  XARELTO    Take 1 Tab by mouth 2 Times a Day for 18 days.   Dose:  15 mg       * rivaroxaban 20 MG Tabs tablet   Start taking on:  7/28/2017   Commonly known as:  XARELTO    Take 1 Tab by mouth every day.   Dose:  20 mg       * Notice:  This list has 2 medication(s) that are the same as other medications prescribed for you. Read the directions carefully, and ask your doctor or other care provider to review them with you.      "

## 2017-07-05 NOTE — ED PROVIDER NOTES
ED Provider Note    CHIEF COMPLAINT  No chief complaint on file.      HPI  Thony Crow is a 52 y.o. male who presents for report of chest pain and shortness of breath. The patient was transferred here from [facility]. He reports that he was not feeling well for a day or so, he had injured his right knee. He has a prior history of ACL surgery several years ago. He was laid up on the couch quite a bit and noticed that his right leg became significantly swollen from the foot all the way to the thigh. Then at around 10 AM this morning reports abrupt onset chest heaviness shortness of breath and chest pain. He denies hemoptysis. He was seen in [facility] and there he had some Q waves in the inferior leads and a slightly elevated troponin of 0.09 they gave him an aspirin and will primarily worried about acute coronary syndrome    REVIEW OF SYSTEMS  See HPI for further details. Patient reports shortness had chest pressure no hemoptysis positive for right leg swelling All other systems are negative.     PAST MEDICAL HISTORY  No past medical history on file.  Right ACL  FAMILY HISTORY  No history of thromboembolic disease    SOCIAL HISTORY  Social History     Social History   • Marital Status: N/A     Spouse Name: N/A   • Number of Children: N/A   • Years of Education: N/A     Social History Main Topics   • Smoking status: Not on file   • Smokeless tobacco: Not on file   • Alcohol Use: Not on file   • Drug Use: Not on file   • Sexual Activity: Not on file     Other Topics Concern   • Not on file     Social History Narrative   • No narrative on file     No drugs occasional alcohol  SURGICAL HISTORY  No past surgical history on file.    CURRENT MEDICATIONS  Home Medications     **Home medications have not yet been reviewed for this encounter**        No regular medications  ALLERGIES  Allergies not on file    PHYSICAL EXAM  VITAL SIGNS:   Resp BP NIBP SpO2 Room air O2 Patient BP Position BP  Location O2 (LPM) O2 Delivery 5 [facility]           07/05/17 1609 -- -- 92 92 -- -- -- 94 % -- -- -- 10 Oxymask -- HRB     07/05/17 1608 -- -- 93 93 -- -- -- 93 % -- -- -- -- -- -- HRB     07/05/17 1607 -- -- -- -- -- -- 138/86 mmHg -- -- -- -- -- -- -- HRB     07/05/17 1530 -- -- 93 89 -- -- 102/80 mmHg 94 % -- -- -- -- -- -- HRB     07/05/17 1511 37.3 °C (99.1 °F) Temporal 89 88  28 -- 106/74 mmHg 95 % -- Supine Right;Upper Arm 5 Nasal Cannula None/Wide Awake HRB         Pain Scale        Date and Time Pain Scale Rating          Constitutional: Patient appears ill  HENT: Normocephalic, Atraumatic, Bilateral external ears normal, Oropharynx moist, No oral exudates, Nose normal.   Eyes: PERRLA, EOMI, Conjunctiva normal, No discharge.   Neck: Normal range of motion, No tenderness, Supple, No stridor.   Cardiovascular: Tachycardic, Normal rhythm, No murmurs, No rubs, No gallops.   Thorax & Lungs: Normal breath sounds, No respiratory distress, No wheezing, No chest tenderness.   Abdomen: Bowel sounds normal, Soft, No tenderness, No masses, No pulsatile masses.   Skin: Warm, Dry, No erythema, No rash.   Back: No tenderness, No CVA tenderness.   Extremities: Intact distal pulses, No edema, No tenderness, No cyanosis, No clubbing.   Musculoskeletal: Good range of motion in all major joints. No tenderness to palpation or major deformities noted.   Neurologic: Alert & oriented x 3, Normal motor function, Normal sensory function, No focal deficits noted.   Psychiatric: Affect normal, Judgment normal, Mood normal.     EKG  Interpretation by me S1 every 3 T3 pattern noted. No acute ST segment elevation or depression or pathological T-wave inversions concerning for pulmonary embolism    RADIOLOGY/PROCEDURES  CT-CTA CHEST PULMONARY ARTERY W/ RECONS   Final Result   Addendum 1 of 1   These findings were discussed with CIELO OLIVARES on 7/5/2017 4:41 PM.      Final      1.  RIGHT ventricular strain extensive acute bilateral pulmonary  emboli with findings raising concern for RIGHT ventricular strain.   2.  No pneumonia or pneumothorax.               OUTSIDE IMAGES-DX CHEST   Preliminary Result        Results for orders placed or performed during the hospital encounter of 17   TROPONIN   Result Value Ref Range    Troponin I 0.69 (H) 0.00 - 0.04 ng/mL   BTYPE NATRIURETIC PEPTIDE   Result Value Ref Range    B Natriuretic Peptide 184 (H) 0 - 100 pg/mL   EKG (ER)   Result Value Ref Range    Report       [facility] Emergency Dept.    Test Date:  2017  Pt Name:    CAROL LOPEZ                 Department: ER  MRN:        [id number]                      Room:        16  Gender:     M                            Technician: [id number]  :        [id number]                   Requested By:CIELO OLIVARES  Order #:    [id number]                    Reading MD:    Measurements  Intervals                                Axis  Rate:       87                           P:          71  TN:         168                          QRS:        29  QRSD:       112                          T:          -15  QT:         388  QTc:        467    Interpretive Statements  SINUS RHYTHM  NONSPECIFIC INTRAVENTRICULAR CONDUCTION DELAY  No previous ECG available for comparison      CBC and metabolic panel from outside facility is unremarkable    COURSE & MEDICAL DECISION MAKING  Pertinent Labs & Imaging studies reviewed. (See chart for details)  The patient arrived here it was quite clear and concerning that he may have a large pulmonary embolism based on his history. His troponin was noted to be going up. He did require relatively high-dose oxygen and the patient appeared clinically ill. Subsequent CT scan demonstrated a massive pulmonary embolism with right ventricular strain and some reflux of contrast. I had a very long talk with the patient. Due to the fact that he is RV strain hypoxia and he is having some hemodynamic issues I felt that he was clearly a  thrombolytic candidate. The question was whether or not we would administer fold also half dose TPA. The literature is somewhat mixed and if the patient truly has significant clot burden with RV strain and elevated troponin recommendations are to treat with full dose. I long talk with the patient. I discussed the risks of full dose versus half dose and the risk of bleeding being slightly higher with full dose however the therapeutic effect may be greater as well. The patient understands the risks and benefits and would like to proceed with full dose thrombolytics. He has no obvious risk factors such as uncontrolled hypertension history of brain mass history of GI bleeding in his actually a very low risk for bleeding. Full dose thrombolytics were administered. The patient will be admitted to intensive care unit. Consultation with pulmonology and critical care team was obtained    CRITICAL CARE TIME:    The patient required approximately 40 minutes worth of critical care time. This excludes any procedures. This includes time spent directly at caring for the patient, making critical medical decisions, involving consultants and speaking with the family.    FINAL IMPRESSION  1. Massive pulmonary embolism requiring treatment with thrombolytics      Electronically signed by: Quoc Campos, 7/5/2017 3:08 PM

## 2017-07-05 NOTE — ED NOTES
Pt bib EMS from [facility] in [place]. Per EMS, Pt had sudden onset of CP starting at 1000 this am. Pt stated he hadn't been feeling well for a couple weeks. Trop was elevated. EKG showed Q waves. Pt was given Nitro x2 with improvement of CP but he started to become hypotensive so no additional Nitro was given. 324mg ASA was given. Per EMS, Pt was noted to be SOB with exertion and de-sats easily. Pt arrives to ED A&Ox4, in mild respiratory distress. SpO2 94% on 4L NC. Pt states he re-injured his R knee recently (hx of R ACL repair) and he wasn't able to walk and his knee is swollen. ED tech to do EKG. ERP at bedside immediately.

## 2017-07-06 LAB
ABO GROUP BLD: NORMAL
ALBUMIN SERPL BCP-MCNC: 2.9 G/DL (ref 3.2–4.9)
ALBUMIN/GLOB SERPL: 1.2 G/DL
ALP SERPL-CCNC: 63 U/L (ref 30–99)
ALT SERPL-CCNC: 24 U/L (ref 2–50)
ANION GAP SERPL CALC-SCNC: 7 MMOL/L (ref 0–11.9)
APTT PPP: 34.5 SEC (ref 24.7–36)
APTT PPP: 37.7 SEC (ref 24.7–36)
APTT PPP: 39.5 SEC (ref 24.7–36)
AST SERPL-CCNC: 21 U/L (ref 12–45)
BASOPHILS # BLD AUTO: 0.7 % (ref 0–1.8)
BASOPHILS # BLD: 0.05 K/UL (ref 0–0.12)
BILIRUB SERPL-MCNC: 0.6 MG/DL (ref 0.1–1.5)
BUN SERPL-MCNC: 14 MG/DL (ref 8–22)
CALCIUM SERPL-MCNC: 7.5 MG/DL (ref 8.5–10.5)
CHLORIDE SERPL-SCNC: 113 MMOL/L (ref 96–112)
CO2 SERPL-SCNC: 21 MMOL/L (ref 20–33)
CREAT SERPL-MCNC: 0.97 MG/DL (ref 0.5–1.4)
EKG IMPRESSION: NORMAL
EOSINOPHIL # BLD AUTO: 0.35 K/UL (ref 0–0.51)
EOSINOPHIL NFR BLD: 4.6 % (ref 0–6.9)
ERYTHROCYTE [DISTWIDTH] IN BLOOD BY AUTOMATED COUNT: 41.1 FL (ref 35.9–50)
GFR SERPL CREATININE-BSD FRML MDRD: >60 ML/MIN/1.73 M 2
GLOBULIN SER CALC-MCNC: 2.5 G/DL (ref 1.9–3.5)
GLUCOSE SERPL-MCNC: 95 MG/DL (ref 65–99)
HCT VFR BLD AUTO: 32.3 % (ref 42–52)
HGB BLD-MCNC: 10.6 G/DL (ref 14–18)
IMM GRANULOCYTES # BLD AUTO: 0.05 K/UL (ref 0–0.11)
IMM GRANULOCYTES NFR BLD AUTO: 0.7 % (ref 0–0.9)
LV EJECT FRACT  99904: 50
LV EJECT FRACT MOD 2C 99903: 52.73
LV EJECT FRACT MOD 4C 99902: 43.3
LV EJECT FRACT MOD BP 99901: 46.82
LYMPHOCYTES # BLD AUTO: 2.18 K/UL (ref 1–4.8)
LYMPHOCYTES NFR BLD: 28.5 % (ref 22–41)
MAGNESIUM SERPL-MCNC: 2 MG/DL (ref 1.5–2.5)
MCH RBC QN AUTO: 30.3 PG (ref 27–33)
MCHC RBC AUTO-ENTMCNC: 32.8 G/DL (ref 33.7–35.3)
MCV RBC AUTO: 92.3 FL (ref 81.4–97.8)
MONOCYTES # BLD AUTO: 0.68 K/UL (ref 0–0.85)
MONOCYTES NFR BLD AUTO: 8.9 % (ref 0–13.4)
NEUTROPHILS # BLD AUTO: 4.34 K/UL (ref 1.82–7.42)
NEUTROPHILS NFR BLD: 56.6 % (ref 44–72)
NRBC # BLD AUTO: 0 K/UL
NRBC BLD AUTO-RTO: 0 /100 WBC
PLATELET # BLD AUTO: 133 K/UL (ref 164–446)
PMV BLD AUTO: 9.9 FL (ref 9–12.9)
POTASSIUM SERPL-SCNC: 3.9 MMOL/L (ref 3.6–5.5)
PROT SERPL-MCNC: 5.4 G/DL (ref 6–8.2)
RBC # BLD AUTO: 3.5 M/UL (ref 4.7–6.1)
SODIUM SERPL-SCNC: 141 MMOL/L (ref 135–145)
TROPONIN I SERPL-MCNC: 0.33 NG/ML (ref 0–0.04)
TROPONIN I SERPL-MCNC: 1.01 NG/ML (ref 0–0.04)
TROPONIN I SERPL-MCNC: 2.07 NG/ML (ref 0–0.04)
WBC # BLD AUTO: 7.7 K/UL (ref 4.8–10.8)

## 2017-07-06 PROCEDURE — 85025 COMPLETE CBC W/AUTO DIFF WBC: CPT

## 2017-07-06 PROCEDURE — 700105 HCHG RX REV CODE 258: Performed by: INTERNAL MEDICINE

## 2017-07-06 PROCEDURE — 93010 ELECTROCARDIOGRAM REPORT: CPT | Performed by: INTERNAL MEDICINE

## 2017-07-06 PROCEDURE — 770001 HCHG ROOM/CARE - MED/SURG/GYN PRIV*

## 2017-07-06 PROCEDURE — 80053 COMPREHEN METABOLIC PANEL: CPT

## 2017-07-06 PROCEDURE — 85730 THROMBOPLASTIN TIME PARTIAL: CPT

## 2017-07-06 PROCEDURE — 700111 HCHG RX REV CODE 636 W/ 250 OVERRIDE (IP): Performed by: INTERNAL MEDICINE

## 2017-07-06 PROCEDURE — 84484 ASSAY OF TROPONIN QUANT: CPT

## 2017-07-06 PROCEDURE — A9270 NON-COVERED ITEM OR SERVICE: HCPCS | Performed by: INTERNAL MEDICINE

## 2017-07-06 PROCEDURE — 93306 TTE W/DOPPLER COMPLETE: CPT | Mod: 26 | Performed by: INTERNAL MEDICINE

## 2017-07-06 PROCEDURE — 93306 TTE W/DOPPLER COMPLETE: CPT

## 2017-07-06 PROCEDURE — 700102 HCHG RX REV CODE 250 W/ 637 OVERRIDE(OP): Performed by: INTERNAL MEDICINE

## 2017-07-06 PROCEDURE — 83735 ASSAY OF MAGNESIUM: CPT

## 2017-07-06 PROCEDURE — 93005 ELECTROCARDIOGRAM TRACING: CPT | Performed by: STUDENT IN AN ORGANIZED HEALTH CARE EDUCATION/TRAINING PROGRAM

## 2017-07-06 PROCEDURE — 93970 EXTREMITY STUDY: CPT

## 2017-07-06 RX ADMIN — RIVAROXABAN 15 MG: 15 TABLET, FILM COATED ORAL at 17:35

## 2017-07-06 RX ADMIN — HEPARIN SODIUM 4000 UNITS: 1000 INJECTION, SOLUTION INTRAVENOUS; SUBCUTANEOUS at 15:59

## 2017-07-06 RX ADMIN — OXYCODONE HYDROCHLORIDE 10 MG: 10 TABLET ORAL at 08:44

## 2017-07-06 RX ADMIN — SODIUM CHLORIDE: 9 INJECTION, SOLUTION INTRAVENOUS at 08:45

## 2017-07-06 RX ADMIN — OXYCODONE HYDROCHLORIDE 10 MG: 10 TABLET ORAL at 16:04

## 2017-07-06 RX ADMIN — HEPARIN SODIUM 1200 UNITS/HR: 5000 INJECTION, SOLUTION INTRAVENOUS at 09:38

## 2017-07-06 RX ADMIN — HEPARIN SODIUM 4000 UNITS: 1000 INJECTION, SOLUTION INTRAVENOUS; SUBCUTANEOUS at 09:37

## 2017-07-06 RX ADMIN — OXYCODONE HYDROCHLORIDE 10 MG: 10 TABLET ORAL at 20:11

## 2017-07-06 RX ADMIN — HEPARIN SODIUM 4000 UNITS: 1000 INJECTION, SOLUTION INTRAVENOUS; SUBCUTANEOUS at 03:35

## 2017-07-06 ASSESSMENT — ENCOUNTER SYMPTOMS
FALLS: 0
TINGLING: 0
DIAPHORESIS: 0
VOMITING: 0
SENSORY CHANGE: 0
STRIDOR: 0
EYE REDNESS: 0
NERVOUS/ANXIOUS: 0
EYE PAIN: 0
SEIZURES: 0
WEIGHT LOSS: 0
TREMORS: 0
EYE DISCHARGE: 0
SPEECH CHANGE: 0
BRUISES/BLEEDS EASILY: 0
HEADACHES: 1
CHILLS: 0
HEARTBURN: 0
DIZZINESS: 0
DIARRHEA: 0
PND: 0
BLURRED VISION: 0
ORTHOPNEA: 0
LOSS OF CONSCIOUSNESS: 0
CONSTIPATION: 0
HALLUCINATIONS: 0
PALPITATIONS: 0
ABDOMINAL PAIN: 0
INSOMNIA: 0
NAUSEA: 0
FEVER: 0
MYALGIAS: 0
SORE THROAT: 0
DEPRESSION: 0
FOCAL WEAKNESS: 0
DOUBLE VISION: 0
WEAKNESS: 0
MEMORY LOSS: 0
CLAUDICATION: 0
BLOOD IN STOOL: 0
NECK PAIN: 0
PHOTOPHOBIA: 0
FLANK PAIN: 0
BACK PAIN: 0
POLYDIPSIA: 0

## 2017-07-06 ASSESSMENT — PAIN SCALES - GENERAL
PAINLEVEL_OUTOF10: 0
PAINLEVEL_OUTOF10: 6
PAINLEVEL_OUTOF10: 5
PAINLEVEL_OUTOF10: 2
PAINLEVEL_OUTOF10: 0
PAINLEVEL_OUTOF10: 7
PAINLEVEL_OUTOF10: 0

## 2017-07-06 ASSESSMENT — LIFESTYLE VARIABLES
DO YOU DRINK ALCOHOL: NO
SUBSTANCE_ABUSE: 0
EVER_SMOKED: YES

## 2017-07-06 ASSESSMENT — PATIENT HEALTH QUESTIONNAIRE - PHQ9
SUM OF ALL RESPONSES TO PHQ9 QUESTIONS 1 AND 2: 0
2. FEELING DOWN, DEPRESSED, IRRITABLE, OR HOPELESS: NOT AT ALL
SUM OF ALL RESPONSES TO PHQ QUESTIONS 1-9: 0
1. LITTLE INTEREST OR PLEASURE IN DOING THINGS: NOT AT ALL

## 2017-07-06 NOTE — ASSESSMENT & PLAN NOTE
Appears 2/2 PE   Improved after tPA.  On RT protocol; Q6H duonebs  O2 requirements decreased with no further desaturations on RA upon ambulation/.

## 2017-07-06 NOTE — ASSESSMENT & PLAN NOTE
Had +troponins that trended downwards.    EKG shows NSR with S1Q3T3  Echo - EF~50%, mod.dilated.R.ventricle, mild R&L .vent.systolic.dysfunciton, and mild MR  Likely 2/2 demand ischemia from PE     (on Xarelto, for PE/DVT).

## 2017-07-06 NOTE — DISCHARGE PLANNING
Met face to face with pt at bedside. Pt stated he was in the process of going to [place] for employment when he was hospitalized. The pt declined any treatment resources stating that he was aware of the resources in [place] when he is currently residing. The pt states that he can get a ride home from a friend upon dc.

## 2017-07-06 NOTE — ED NOTES
Bedside report given to Martha SANTIAGO. Pt transported to [facility] ICU via gurney with 2 RNs on monitor.

## 2017-07-06 NOTE — CARE PLAN
Problem: Safety  Goal: Will remain free from injury  Intervention: Provide assistance with mobility  Bed alarm in use, call light within reach. Patient family oriented to surroundings. Aspiration precautions in place. BVM at bedside. VS on monitor      Problem: Pain Management  Goal: Pain level will decrease to patient’s comfort goal  Intervention: Follow pain managment plan developed in collaboration with patient and Interdisciplinary Team  Patient educated on available pain control medications and non pharmacologic pain control mechanisms. Patient demonstrates understanding of use of call light and 1-10 pain scale.

## 2017-07-06 NOTE — PROGRESS NOTES
"Pulmonary Critical Care Progress Note        Date of service:  2017    Interval Events:  Seen with [facility]  Called to ER to see this gentleman for ICU admission    This gentleman is transferred to [facility] from an outside hospital.  About one week ago he injured his right knee.  The next day he noticed right leg swelling.  Yesterday he went camping.  His swelling was getting progressively worse until yesterday, when it started to improve.  This morning he was packing up his camping supplies and had the sudden onset of CP, SOB, weakness.  He has no prior history of PE/DVT.  He injured his right knee many years ago and had surgery on his right knee about 20 years ago.  He smokes an occasional cigar and marijuana daily.  He does not use any other drugs.  He does not abuse ETOH.  He has no other significant PMH.    He has a cough with \"sticky\" white sputum production.  He denies hemoptysis.  He felt weak and light-headed, but did not have syncope.      PFSH:  No change.    Respiratory:     Pulse Oximetry: 98 %  CTA reviewed:  Large bilateral PE with right heart strain.  Wheezes bilaterally    HemoDynamics:  Pulse: 94, Heart Rate (Monitored): 92  NIBP: 125/87 mmHg     SR  Mild edema in the right leg    Recent Labs      17   1523   TROPONINI  0.69*   BNPBTYPENAT  184*       Neuro:  Awake and alert  No focal weakness    Fluids:  Intake/Output     None        Weight: (!) 131.543 kg (290 lb)  No results for input(s): SODIUM, POTASSIUM, CHLORIDE, CO2, BUN, CREATININE, MAGNESIUM, PHOSPHORUS, CALCIUM in the last 72 hours.    GI/Nutrition:  Abd soft ND/NT    Liver Function  No results for input(s): ALTSGPT, ASTSGOT, ALKPHOSPHAT, TBILIRUBIN, DBILIRUBIN, GAMMAGT, AMYLASE, LIPASE, ALB, PREALBUMIN, GLUCOSE in the last 72 hours.    Heme:  Recent Labs      17   1523   PROTHROMBTM  14.8*   APTT  25.3   INR  1.12       Infectious Disease:  Temp  Av.3 °C (99.1 °F)  Min: 37.3 °C (99.1 °F)  Max: 37.3 °C (99.1 " °F)        Current Facility-Administered Medications   Medication Dose Frequency Provider Last Rate Last Dose   • alteplase (ACTIVASE) injection 100 mg  100 mg Once Quoc Campos M.D.   100 mg at 07/05/17 1718   • NS (BOLUS) infusion 50 mL  50 mL Continuous Quoc Campos M.D.       • heparin 1000 units/mL injection 4,000 Units  4,000 Units PRN Quoc Campos M.D.        And   • heparin infusion 25,000 units in 500 ml 0.45% nacl   Continuous Quoc Campos M.D.       • senna-docusate (PERICOLACE or SENOKOT S) 8.6-50 MG per tablet 2 Tab  2 Tab BID Dione Hernandez M.D.        And   • polyethylene glycol/lytes (MIRALAX) PACKET 1 Packet  1 Packet QDAY PRN Dione Hernandez M.D.        And   • magnesium hydroxide (MILK OF MAGNESIA) suspension 30 mL  30 mL QDAY PRN Dione Hernandez M.D.        And   • bisacodyl (DULCOLAX) suppository 10 mg  10 mg QDAY PRN Dione Hernandez M.D.       • Respiratory Care per Protocol   Continuous RT Dione Hernandez M.D.       • NS infusion   Continuous Dione Hernandez M.D.       • acetaminophen (TYLENOL) tablet 650 mg  650 mg Q6HRS PRN Dione Hernandez M.D.       • morphine (pf) 4 mg/ml injection 2-4 mg  2-4 mg Q4HRS PRN Dione Hernandez M.D.       • oxycodone immediate-release (ROXICODONE) tablet 5 mg  5 mg Q4HRS PRN Dione Hernandez M.D.        Or   • oxycodone immediate-release (ROXICODONE) tablet 10 mg  10 mg Q4HRS PRN Dione Hernandez M.D.       • enalaprilat (VASOTEC) injection 1.25 mg  1.25 mg Q6HRS PRN Dione Hernandez M.D.       • ondansetron (ZOFRAN) syringe/vial injection 4 mg  4 mg Q4HRS PRN Dione Cottrellkar, M.D.       • ondansetron (ZOFRAN ODT) dispertab 4 mg  4 mg Q4HRS PRJHONATAN Hernandez M.D.       • promethazine (PHENERGAN) tablet 12.5-25 mg  12.5-25 mg Q4HRS PRJHONATAN Hernandez M.D.       • promethazine (PHENERGAN) suppository 12.5-25 mg  12.5-25 mg Q4HRS PRJHONATAN Heranndez,  M.D.       • prochlorperazine (COMPAZINE) injection 5-10 mg  5-10 mg Q4HRS PRN Dione Hernandez M.D.       • guaifenesin DM (ROBITUSSIN DM) 100-10 MG/5ML syrup 10 mL  10 mL Q6HRS PRN Dione Hernandez M.D.       • MD ALERT...Heparin Post-Fibrinolytic Protocol Pharmacist to implement   PRN Dione Hernandez M.D.         Last reviewed on 7/5/2017  4:58 PM by Terrence Alcocer    Quality  Measures:  Labs reviewed, Medications reviewed and Radiology images reviewed                        Assessment and Plan:    Acute hypoxemic respiratory failure   - oxygen  Acute bilateral PE with right heart strain   - 100 mg IV alteplase followed by heparin on the post-fibrinolytic protocol   - echocardiogram  Suspect acute RLE DVT - check LE venous doppler    Admit to ICU.  Critically ill.    Critical Care Time:  35 minutes  39177  No time overlap  Time excludes procedures  Discussed with RN, RT, ER physician, Resident

## 2017-07-06 NOTE — PROGRESS NOTES
Pulmonary Critical Care Progress Note        Date of service:  7/6/2017    Interval Events:  24 hour interval history reviewed.  Reason for visit:  Acute PE  Seen with [employer]      S/P alteplase  Heparin gtt - transition to Xarelto  SR  4L NC    Improved SOB and CP.  No hemoptysis.  Mild dry cough.  No wheezing.  No angina, palp, syncope  No increased right leg pain or edema - improved  No HA, Sz, diplopia  No N/V/P/D  No dysuria or hematuria  No rash    The complete AMA/CMS system review does not reveal additional positive findings.      PFSH:  No change.    Respiratory:     Pulse Oximetry: 100 %  CTA reviewed:  Large bilateral PE with right heart strain.  Improved exam with resolution of wheezing  4 L NC  No dullness    HemoDynamics:  Pulse: 61, Heart Rate (Monitored): 62  NIBP: 113/71 mmHg     SR  Mild edema in the right leg  Left leg normal    Recent Labs      07/05/17   1523  07/05/17   2300  07/06/17   0235   TROPONINI  0.69*  2.07*  1.01*   BNPBTYPENAT  184*   --    --        Neuro:  Awake and alert  No focal weakness  Fully oriented    Fluids:  Intake/Output       07/04/17 0700 - 07/05/17 0659 (Not Admitted) 07/05/17 0700 - 07/06/17 0659 07/06/17 0700 - 07/07/17 0659      2185-9700 5801-4694 Total 0795-5454 2925-6530 Total 4370-0029 2372-1224 Total       Intake    P.O.  --  -- --  --  1350 1350  --  -- --    P.O. -- -- -- -- 1350 1350 -- -- --    I.V.  --  -- --  --  844.9 844.9  --  -- --    Heparin Volume -- -- -- -- 169.9 169.9 -- -- --    IV Volume (NS) -- -- -- -- 675 675 -- -- --    Total Intake -- -- -- -- 2194.9 2194.9 -- -- --       Output    Urine  --  -- --  --  1600 1600  --  -- --    Number of Times Voided -- -- -- -- 3 x 3 x -- -- --    Void (ml) -- -- -- -- 1600 1600 -- -- --    Stool  --  -- --  --  -- --  --  -- --    Number of Times Stooled -- -- -- -- 0 x 0 x -- -- --    Total Output -- -- -- -- 1600 1600 -- -- --       Net I/O     -- -- -- -- 594.9 594.9 -- -- --        Weight: (!)  133.5 kg (294 lb 5 oz)  Recent Labs      17   SODIUM  141   POTASSIUM  3.9   CHLORIDE  113*   CO2  21   BUN  14   CREATININE  0.97   MAGNESIUM  2.0   CALCIUM  7.5*       GI/Nutrition:  Abd soft ND/NT  No N/V/P    Liver Function  Recent Labs      17   ALTSGPT  24   ASTSGOT  21   ALKPHOSPHAT  63   TBILIRUBIN  0.6   GLUCOSE  95       Heme:  Recent Labs      17   1523  17   RBC   --   3.50*   HEMOGLOBIN   --   10.6*   HEMATOCRIT   --   32.3*   PLATELETCT   --   133*   PROTHROMBTM  14.8*   --    APTT  25.3  39.5*   INR  1.12   --        Infectious Disease:  Temp  Av.1 °C (98.8 °F)  Min: 36.7 °C (98.1 °F)  Max: 37.4 °C (99.4 °F)    Recent Labs      17   WBC  7.7   NEUTSPOLYS  56.60   LYMPHOCYTES  28.50   MONOCYTES  8.90   EOSINOPHILS  4.60   BASOPHILS  0.70   ASTSGOT  21   ALTSGPT  24   ALKPHOSPHAT  63   TBILIRUBIN  0.6     Current Facility-Administered Medications   Medication Dose Frequency Provider Last Rate Last Dose   • heparin 1000 units/mL injection 4,000 Units  4,000 Units PRN Dione Hernandez M.D.   4,000 Units at 17    And   • heparin infusion 25,000 units in 500 ml 0.45% nacl   Continuous Dione Hernandez M.D. 22 mL/hr at 17 1,100 Units/hr at 17   • senna-docusate (PERICOLACE or SENOKOT S) 8.6-50 MG per tablet 2 Tab  2 Tab BID Dione Hernandez M.D.        And   • polyethylene glycol/lytes (MIRALAX) PACKET 1 Packet  1 Packet QDAY PRJHONATAN Hernandez M.D.        And   • magnesium hydroxide (MILK OF MAGNESIA) suspension 30 mL  30 mL QDAY TONY Hernandez M.D.        And   • bisacodyl (DULCOLAX) suppository 10 mg  10 mg QDAY PRJHONATAN Hernandez M.D.       • Respiratory Care per Protocol   Continuous RT Dione Hernandez M.D.       • NS infusion   Continuous Dione Hernandez M.D. 75 mL/hr at 17     • acetaminophen (TYLENOL) tablet 650 mg  650 mg Q6HRS PRN  Dione Hernandez M.D.       • morphine (pf) 4 mg/ml injection 2-4 mg  2-4 mg Q4HRS PRN Dione Hernandez M.D.   4 mg at 07/05/17 2316   • oxycodone immediate-release (ROXICODONE) tablet 5 mg  5 mg Q4HRS PRN Dione Hernandez M.D.        Or   • oxycodone immediate-release (ROXICODONE) tablet 10 mg  10 mg Q4HRS PRN Dione Hernandez M.D.       • enalaprilat (VASOTEC) injection 1.25 mg  1.25 mg Q6HRS PRN Dione Hernandez M.D.       • ondansetron (ZOFRAN) syringe/vial injection 4 mg  4 mg Q4HRS PRN Dione Hernandez M.D.       • ondansetron (ZOFRAN ODT) dispertab 4 mg  4 mg Q4HRS PRN Dione Hernandez M.D.       • promethazine (PHENERGAN) tablet 12.5-25 mg  12.5-25 mg Q4HRS PRN Dione Hernandez M.D.       • promethazine (PHENERGAN) suppository 12.5-25 mg  12.5-25 mg Q4HRS PRN Dione Hernandez M.D.       • prochlorperazine (COMPAZINE) injection 5-10 mg  5-10 mg Q4HRS PRN Dione Hernandez M.D.       • guaifenesin DM (ROBITUSSIN DM) 100-10 MG/5ML syrup 10 mL  10 mL Q6HRS PRN Dione Hernandez M.D.       • ipratropium-albuterol (DUONEB) nebulizer solution 3 mL  3 mL Q6HRS PRN (RT) Dione Hernandez M.D.         Last reviewed on 7/5/2017  4:58 PM by Terrence Alcocer    Quality  Measures:  Labs reviewed, Medications reviewed and Radiology images reviewed                        Assessment and Plan:    Acute hypoxemic respiratory failure   - oxygen  Acute bilateral PE with right heart strain   - S/P alteplase   - change heparin gtt to rivaroxaban   - echocardiogram with decreased RV function  Acute RLE DVT - cont anticoagulation    OK to transfer out of ICU.  [facility] will continue to follow.    Discussed with RN, RT, ER physician, Resident      Kristi BERNARD (Marcin), am scribing for, and in the presence of, Aric Lozano M.D.    Electronically signed by: Kristi Hook (Marcin), 7/6/2017    Aric BERNARD M.D. personally performed the  services described in this documentation, as scribed by Kristi Hook in my presence, and it is both accurate and complete.

## 2017-07-06 NOTE — ED NOTES
XR at bedside for R knee. Pt states the 1mg of morphine was ineffective for his chest pain. Will medicate with additional morphine per PRN orders. Water provided to Pt.

## 2017-07-06 NOTE — ASSESSMENT & PLAN NOTE
pt presented with sudden onset chest pain to outside facility;   EKG showed  S1Q3T3 changes.    CTPE shows extensive acute b/l PEs with e/o RV strain;   tPA administered in ER (full dose);  Then watched in ICU.  LE duplex demonstrated DVT in right leg, and he noted that the pain and swelling began from knee down (rather than limited to right knee).  Was on heparin post-tPA; then switched to Xarelto.   Keep overnight and monitor.  Will likely need lifelong anticoagulaiton, since this appears unprovoked DVT / PE.

## 2017-07-06 NOTE — PROGRESS NOTES
Repeat troponin elevated at 2.9. EKG without new acute ischemic change and continued T wave inversion in lead 3. S wave in lead 1 minimally apparent. Patient resting at this time. Troponin elevation likely secondary to degree of right heart strain given bilateral PE. Patient scheduled for echocardiogram in AM.

## 2017-07-06 NOTE — ASSESSMENT & PLAN NOTE
Creat reported to have been 1.39 in [place]       (? was an abrupt increase ?)  likely prerenal; was hydrated  Cr now stable (aorund 1)

## 2017-07-06 NOTE — ED NOTES
TPA started per orders. See doc flowsheets for VS and neuro checks. Admitting MD at bedside at this time. Pt requested pain medication for his chest pain. New orders june.

## 2017-07-07 LAB
ALBUMIN SERPL BCP-MCNC: 3 G/DL (ref 3.2–4.9)
ALBUMIN/GLOB SERPL: 1.2 G/DL
ALP SERPL-CCNC: 66 U/L (ref 30–99)
ALT SERPL-CCNC: 21 U/L (ref 2–50)
ANION GAP SERPL CALC-SCNC: 8 MMOL/L (ref 0–11.9)
APTT PPP: 29.1 SEC (ref 24.7–36)
AST SERPL-CCNC: 15 U/L (ref 12–45)
BASOPHILS # BLD AUTO: 0.7 % (ref 0–1.8)
BASOPHILS # BLD: 0.04 K/UL (ref 0–0.12)
BILIRUB SERPL-MCNC: 0.5 MG/DL (ref 0.1–1.5)
BUN SERPL-MCNC: 11 MG/DL (ref 8–22)
CALCIUM SERPL-MCNC: 7.8 MG/DL (ref 8.5–10.5)
CHLORIDE SERPL-SCNC: 108 MMOL/L (ref 96–112)
CO2 SERPL-SCNC: 22 MMOL/L (ref 20–33)
CREAT SERPL-MCNC: 0.93 MG/DL (ref 0.5–1.4)
EOSINOPHIL # BLD AUTO: 0.4 K/UL (ref 0–0.51)
EOSINOPHIL NFR BLD: 6.6 % (ref 0–6.9)
ERYTHROCYTE [DISTWIDTH] IN BLOOD BY AUTOMATED COUNT: 40.5 FL (ref 35.9–50)
GFR SERPL CREATININE-BSD FRML MDRD: >60 ML/MIN/1.73 M 2
GLOBULIN SER CALC-MCNC: 2.5 G/DL (ref 1.9–3.5)
GLUCOSE SERPL-MCNC: 89 MG/DL (ref 65–99)
HCT VFR BLD AUTO: 32.3 % (ref 42–52)
HGB BLD-MCNC: 10.5 G/DL (ref 14–18)
IMM GRANULOCYTES # BLD AUTO: 0.04 K/UL (ref 0–0.11)
IMM GRANULOCYTES NFR BLD AUTO: 0.7 % (ref 0–0.9)
LYMPHOCYTES # BLD AUTO: 1.51 K/UL (ref 1–4.8)
LYMPHOCYTES NFR BLD: 24.9 % (ref 22–41)
MAGNESIUM SERPL-MCNC: 1.9 MG/DL (ref 1.5–2.5)
MCH RBC QN AUTO: 30.2 PG (ref 27–33)
MCHC RBC AUTO-ENTMCNC: 32.5 G/DL (ref 33.7–35.3)
MCV RBC AUTO: 92.8 FL (ref 81.4–97.8)
MONOCYTES # BLD AUTO: 0.46 K/UL (ref 0–0.85)
MONOCYTES NFR BLD AUTO: 7.6 % (ref 0–13.4)
NEUTROPHILS # BLD AUTO: 3.61 K/UL (ref 1.82–7.42)
NEUTROPHILS NFR BLD: 59.5 % (ref 44–72)
NRBC # BLD AUTO: 0 K/UL
NRBC BLD AUTO-RTO: 0 /100 WBC
PLATELET # BLD AUTO: 149 K/UL (ref 164–446)
PMV BLD AUTO: 10 FL (ref 9–12.9)
POTASSIUM SERPL-SCNC: 3.9 MMOL/L (ref 3.6–5.5)
PROT SERPL-MCNC: 5.5 G/DL (ref 6–8.2)
RBC # BLD AUTO: 3.48 M/UL (ref 4.7–6.1)
SODIUM SERPL-SCNC: 138 MMOL/L (ref 135–145)
WBC # BLD AUTO: 6.1 K/UL (ref 4.8–10.8)

## 2017-07-07 PROCEDURE — A9270 NON-COVERED ITEM OR SERVICE: HCPCS | Performed by: INTERNAL MEDICINE

## 2017-07-07 PROCEDURE — 80053 COMPREHEN METABOLIC PANEL: CPT

## 2017-07-07 PROCEDURE — 700102 HCHG RX REV CODE 250 W/ 637 OVERRIDE(OP): Performed by: INTERNAL MEDICINE

## 2017-07-07 PROCEDURE — 770006 HCHG ROOM/CARE - MED/SURG/GYN SEMI*

## 2017-07-07 PROCEDURE — 85730 THROMBOPLASTIN TIME PARTIAL: CPT

## 2017-07-07 PROCEDURE — 700111 HCHG RX REV CODE 636 W/ 250 OVERRIDE (IP): Performed by: INTERNAL MEDICINE

## 2017-07-07 PROCEDURE — 83735 ASSAY OF MAGNESIUM: CPT

## 2017-07-07 PROCEDURE — 85025 COMPLETE CBC W/AUTO DIFF WBC: CPT

## 2017-07-07 RX ORDER — KETOROLAC TROMETHAMINE 30 MG/ML
30 INJECTION, SOLUTION INTRAMUSCULAR; INTRAVENOUS EVERY 6 HOURS
Status: COMPLETED | OUTPATIENT
Start: 2017-07-07 | End: 2017-07-09

## 2017-07-07 RX ADMIN — STANDARDIZED SENNA CONCENTRATE AND DOCUSATE SODIUM 2 TABLET: 8.6; 5 TABLET, FILM COATED ORAL at 20:07

## 2017-07-07 RX ADMIN — KETOROLAC TROMETHAMINE 30 MG: 30 INJECTION, SOLUTION INTRAMUSCULAR at 23:40

## 2017-07-07 RX ADMIN — OXYCODONE HYDROCHLORIDE 5 MG: 5 TABLET ORAL at 22:01

## 2017-07-07 RX ADMIN — RIVAROXABAN 15 MG: 15 TABLET, FILM COATED ORAL at 20:08

## 2017-07-07 RX ADMIN — KETOROLAC TROMETHAMINE 30 MG: 30 INJECTION, SOLUTION INTRAMUSCULAR at 10:46

## 2017-07-07 RX ADMIN — OXYCODONE HYDROCHLORIDE 10 MG: 10 TABLET ORAL at 12:38

## 2017-07-07 RX ADMIN — ONDANSETRON 4 MG: 2 INJECTION INTRAMUSCULAR; INTRAVENOUS at 23:48

## 2017-07-07 RX ADMIN — RIVAROXABAN 15 MG: 15 TABLET, FILM COATED ORAL at 08:11

## 2017-07-07 RX ADMIN — KETOROLAC TROMETHAMINE 30 MG: 30 INJECTION, SOLUTION INTRAMUSCULAR at 17:23

## 2017-07-07 ASSESSMENT — ENCOUNTER SYMPTOMS
BLOOD IN STOOL: 0
FOCAL WEAKNESS: 0
ORTHOPNEA: 0
EYE PAIN: 0
DEPRESSION: 0
FEVER: 0
ABDOMINAL PAIN: 0
HALLUCINATIONS: 0
WEIGHT LOSS: 0
EYE REDNESS: 0
SORE THROAT: 0
SPEECH CHANGE: 0
EYE DISCHARGE: 0
NECK PAIN: 0
VOMITING: 0
CHILLS: 0
MEMORY LOSS: 0
BLURRED VISION: 0
PHOTOPHOBIA: 0
BRUISES/BLEEDS EASILY: 0
HEADACHES: 0
PALPITATIONS: 0
HEARTBURN: 0
INSOMNIA: 0
SPUTUM PRODUCTION: 0
COUGH: 0
SEIZURES: 0
TINGLING: 0
DIAPHORESIS: 0
WEAKNESS: 0
DIZZINESS: 0
LOSS OF CONSCIOUSNESS: 0
DOUBLE VISION: 0
MYALGIAS: 0
NERVOUS/ANXIOUS: 0
FALLS: 0
WHEEZING: 0
POLYDIPSIA: 0
HEMOPTYSIS: 0
SENSORY CHANGE: 0
DIARRHEA: 0
CONSTIPATION: 0
CLAUDICATION: 0
FLANK PAIN: 0
PND: 0
BACK PAIN: 0
SHORTNESS OF BREATH: 0
NAUSEA: 0
STRIDOR: 0
TREMORS: 0

## 2017-07-07 ASSESSMENT — PAIN SCALES - GENERAL
PAINLEVEL_OUTOF10: 0
PAINLEVEL_OUTOF10: 2
PAINLEVEL_OUTOF10: 2
PAINLEVEL_OUTOF10: 0
PAINLEVEL_OUTOF10: 6

## 2017-07-07 ASSESSMENT — LIFESTYLE VARIABLES
SUBSTANCE_ABUSE: 0
DO YOU DRINK ALCOHOL: NO

## 2017-07-07 NOTE — PROGRESS NOTES
[facility] Progress Note      Admit Date: 7/5/2017  ICU Day: 2    Resident(s): Peter Villagran  Attending: CARLOTA ACEVEDO/ Dr. Lozano    Date & Time:   7/6/2017   8:16 PM       Patient ID:    Name:             Thony Crow   YOB: 1965  Age:                 52 y.o.  male   MRN:               [id number]    HPI:  53 y/o M w/ PMH of R ACL injury and repair 20 years ago was taken to the [facility] ED c/o sudden onset chest pain, diaphoresis and dyspnea. Pt trops were at 0.9 and with hypoxemia requiring 15 and then later 8-10 L/min O2. He recived ASA and 2 nitro tabs after which his BP dropped and his was transferred to [facility] were EKG showed S1Q3T3 pattern and CTPE here showed bilateral diffuse PE. Pt was given tPA at ED and admitted to the ICU where his chest pain improved. RLE venous doppler showed distal external iliac and proximal common femoral and proximal greater saphenous veins are dilated with throumbus. Pt was started on heparin.     Consultants:    PMA: Dr. Espino    Interval Events:    7/6/2017 - Pt was interviewed at bedside and found alert, oriented, afebrile and in NAD. He reports improvement in chest discomfort and feels no SOB. Pt was transition to Xarelto and is currently on a 4L NC. Pt will be transfer to the [facility].       Review of Systems   Constitutional: Negative for fever, chills, weight loss, malaise/fatigue and diaphoresis.   HENT: Negative for congestion, ear discharge, ear pain, hearing loss, nosebleeds, sore throat and tinnitus.    Eyes: Negative for blurred vision, double vision, photophobia, pain, discharge and redness.   Respiratory: Negative for stridor.    Cardiovascular: Negative for chest pain, palpitations, orthopnea, claudication, leg swelling and PND.   Gastrointestinal: Negative for heartburn, nausea, vomiting, abdominal pain, diarrhea, constipation, blood in stool and melena.   Genitourinary: Negative for dysuria, urgency,  frequency and flank pain.   Musculoskeletal: Negative for myalgias, back pain, joint pain, falls and neck pain.   Skin: Negative for itching and rash.   Neurological: Positive for headaches. Negative for dizziness, tingling, tremors, sensory change, speech change, focal weakness, seizures, loss of consciousness and weakness.   Endo/Heme/Allergies: Negative for environmental allergies and polydipsia. Does not bruise/bleed easily.   Psychiatric/Behavioral: Negative for depression, suicidal ideas, hallucinations, memory loss and substance abuse. The patient is not nervous/anxious and does not have insomnia.        Physical Exam       Filed Vitals:    07/06/17 1700 07/06/17 1717 07/06/17 1800 07/06/17 2000   Pulse: 69 69  68   Temp:   37.2 °C (99 °F) 37.5 °C (99.5 °F)   Resp: 20 22  20   Height:       Weight:       SpO2: 97% 97%  97%     Body mass index is 33.14 kg/(m^2).   Oxygen Therapy:  Pulse Oximetry: 97 %, O2 (LPM): 2, O2 Delivery: Silicone Nasal Cannula    Physical Exam   Constitutional: He is oriented to person, place, and time and well-developed, well-nourished, and in no distress.   Eyes: Conjunctivae and EOM are normal. Pupils are equal, round, and reactive to light.   Neck: Normal range of motion. Neck supple.   Cardiovascular: Normal rate, regular rhythm, normal heart sounds and intact distal pulses.  Exam reveals no gallop and no friction rub.    No murmur heard.  Pulmonary/Chest: No respiratory distress. He has wheezes. He has no rales. He exhibits tenderness.   Abdominal: Soft. Bowel sounds are normal. He exhibits no distension and no mass. There is no tenderness. There is no rebound and no guarding.   Musculoskeletal: Normal range of motion. He exhibits no edema or tenderness.   Neurological: He is alert and oriented to person, place, and time. No cranial nerve deficit.       Respiratory:     Respiration: 20, Pulse Oximetry: 97 %    Chest Tube Drains:          Invalid input(s):  [id number]    HemoDynamics:  Pulse: 68, Heart Rate (Monitored): 65 NIBP: 115/62 mmHg      Neuro:      Fluids:    Date 17 0700 - 17 0659   Shift 5537-3893 8328-6597 8660-8751 24 Hour Total   I  N  T  A  K  E   P.O. 200   200      P.O. 200   200    I.V. 788.7 404  1192.8      Heparin Volume 188.7 104  292.8      IV Volume (NS) 600 300  900    Shift Total 988.7 404  1392.8   O  U  T  P  U  T   Urine 400 650  1050      Number of Times Voided 1 x 2 x  3 x      Void (ml) 400 650  1050    Shift Total 400 650  1050   .7 -246  342.8        Intake/Output Summary (Last 24 hours) at 17 1917  Last data filed at 17 1800   Gross per 24 hour   Intake 4281.69 ml   Output   2550 ml   Net 1731.69 ml         Body mass index is 33.14 kg/(m^2).    Recent Labs      17   023   SODIUM  141   POTASSIUM  3.9   CHLORIDE  113*   CO2  21   BUN  14   CREATININE  0.97   MAGNESIUM  2.0   CALCIUM  7.5*       GI/Nutrition:  Recent Labs      17   023   ALTSGPT  24   ASTSGOT  21   ALKPHOSPHAT  63   TBILIRUBIN  0.6   GLUCOSE  95       Heme:  Recent Labs      17   1523  17   0235  17   0850  17   1442   RBC   --   3.50*   --    --    HEMOGLOBIN   --   10.6*   --    --    HEMATOCRIT   --   32.3*   --    --    PLATELETCT   --   133*   --    --    PROTHROMBTM  14.8*   --    --    --    APTT  25.3  39.5*  37.7*  34.5   INR  1.12   --    --    --        Infectious Disease:  Temp  Av.1 °C (98.7 °F)  Min: 36.7 °C (98.1 °F)  Max: 37.5 °C (99.5 °F)  Recent Labs      17   023   WBC  7.7   NEUTSPOLYS  56.60   LYMPHOCYTES  28.50   MONOCYTES  8.90   EOSINOPHILS  4.60   BASOPHILS  0.70   ASTSGOT  21   ALTSGPT  24   ALKPHOSPHAT  63   TBILIRUBIN  0.6       Meds:  • rivaroxaban  15 mg      Followed by   • [START ON 2017] rivaroxaban  20 mg     • senna-docusate  2 Tab      And   • polyethylene glycol/lytes  1 Packet      And   • magnesium hydroxide  30 mL      And   • bisacodyl  10  mg     • Respiratory Care per Protocol       • acetaminophen  650 mg     • morphine injection  2-4 mg     • oxycodone immediate-release  5 mg      Or   • oxycodone immediate-release  10 mg     • enalaprilat  1.25 mg     • ondansetron  4 mg     • ondansetron  4 mg     • promethazine  12.5-25 mg     • promethazine  12.5-25 mg     • prochlorperazine  5-10 mg     • guaifenesin DM  10 mL     • ipratropium-albuterol  3 mL          Imaging:  ECHOCARDIOGRAM-COMP W/ CONT   Final Result      LE Venous Duplex-DVT ([facility] only)   Preliminary Result      DX-KNEE COMPLETE 4+ RIGHT   Final Result      1.  No evidence of acute fracture or dislocation.      2.  Prior anterior cruciate ligament graft repair.      3.  Tricompartment degenerative change.      4.  Multiple intra-articular ossific loose bodies.      CT-CTA CHEST PULMONARY ARTERY W/ RECONS   Final Result   Addendum 1 of 1   These findings were discussed with CIELO OLIVARES on 7/5/2017 4:41 PM.      Final      1.  RIGHT ventricular strain extensive acute bilateral pulmonary emboli with findings raising concern for RIGHT ventricular strain.   2.  No pneumonia or pneumothorax.               OUTSIDE IMAGES-DX CHEST   Preliminary Result          Problem and Plan:  * Pulmonary embolism (CMS-HCC) (present on admission)  Assessment & Plan  pt presented with sudden onset chest pain to outside facility; has h/o recent immobility after ACL injury with h/o LE pain and swelling which resolved recently; EKG shows S1Q3T3 changes; CXR from outside facility shows mild pulmonary congestion; ; CTPE shows extensive acute b/l PEs with e/o RV strain; full dose tPA administered in ER; will admit to ICU for monitoring post-tPA; LE duplex ordered to evaluate for DVT; start heparin gtt post-tpa and will need to transition to oral anticoagulation 24 hours post-tPA    Non-ST elevation myocardial infarction (NSTEMI) due to mismatch of myocardial oxygen supply and demand  (CMS-HCC) (present on admission)  Assessment & Plan  2/2 demand ischemia from PE; trop trended up from outside facility; EKG shows NSR with S1Q3T3; will trend trop to monitor for improvement with treatment of PE    Acute hypoxemic respiratory failure (CMS-HCC) (present on admission)  Assessment & Plan  2/2 PE; wheezing noted on exam; continue O2 by oxymask; RT protocol; Q6H duonebs    MARJAN (acute kidney injury) (CMS-HCC) (present on admission)  Assessment & Plan  Creat 1.39 in [place]; likely prerenal; will hydrate    Hypokalemia (present on admission)  Assessment & Plan  K 3.8 in [place]; replete prn        DISPO: ICU    CODE STATUS: FULL    Quality Measures:  Sanchez Catheter: not indicated  DVT Prophylaxis: Xarelto  Lines: PIV

## 2017-07-07 NOTE — RESPIRATORY CARE
COPD EDUCATION by COPD CLINICAL EDUCATOR  7/6/2017 at 5:17 PM by Anaya Hamilton     Patient reviewed by COPD education team. Patient does not qualify for COPD program.

## 2017-07-07 NOTE — PROGRESS NOTES
[facility] Progress Note      Admit Date: 7/5/2017  ICU Day: 3    Resident(s): Peter Villagran  Attending: CARLOTA ACEVEDO/ Dr. Dr. Lozano    Date & Time:   7/7/2017   1:38 PM       Patient ID:    Name:             Thony Crow   YOB: 1965  Age:                 52 y.o.  male   MRN:               [id number]    HPI:  53 y/o M w/ PMH of R ACL injury and repair 20 years ago was taken to the [facility] ED c/o sudden onset chest pain, diaphoresis and dyspnea. Pt trops were at 0.9 and with hypoxemia requiring 15 and then later 8-10 L/min O2. He recived ASA and 2 nitro tabs after which his BP dropped and his was transferred to [facility] were EKG showed S1Q3T3 pattern and CTPE here showed bilateral diffuse PE. Pt was given tPA at ED and admitted to the ICU where his chest pain improved. RLE venous doppler showed distal external iliac and proximal common femoral and proximal greater saphenous veins are dilated with throumbus. Pt was started on heparin.     Consultants:    PMA: Dr. Espino    Interval Events:    7/7/2017 - Pt in NAD, found alert, oriented and afebril. Pt tolerating diet well. He is on room air with no c/o SOB. Pt c/o L sided rib pain. Toradol 30 mg for pleuritic pain. Pt for transfer to [facility].    7/6/2017 - Pt was interviewed at bedside and found alert, oriented, afebrile and in NAD. He reports improvement in chest discomfort and feels no SOB. Pt was transition to Xarelto and is currently on a 4L NC. Pt will be transfer to the [facility].     Review of Systems   Constitutional: Negative for fever, chills, weight loss, malaise/fatigue and diaphoresis.   HENT: Negative for congestion, ear discharge, ear pain, hearing loss, nosebleeds, sore throat and tinnitus.    Eyes: Negative for blurred vision, double vision, photophobia, pain, discharge and redness.   Respiratory: Negative for cough, hemoptysis, sputum production, shortness of breath, wheezing and stridor.     Cardiovascular: Positive for chest pain. Negative for palpitations, orthopnea, claudication, leg swelling and PND.   Gastrointestinal: Negative for heartburn, nausea, vomiting, abdominal pain, diarrhea, constipation, blood in stool and melena.   Genitourinary: Negative for dysuria, urgency, frequency, hematuria and flank pain.   Musculoskeletal: Negative for myalgias, back pain, joint pain, falls and neck pain.   Skin: Negative for itching and rash.   Neurological: Negative for dizziness, tingling, tremors, sensory change, speech change, focal weakness, seizures, loss of consciousness, weakness and headaches.   Endo/Heme/Allergies: Negative for environmental allergies and polydipsia. Does not bruise/bleed easily.   Psychiatric/Behavioral: Negative for depression, suicidal ideas, hallucinations, memory loss and substance abuse. The patient is not nervous/anxious and does not have insomnia.        Physical Exam       Filed Vitals:    07/07/17 0400 07/07/17 0800 07/07/17 0900 07/07/17 1000   Pulse: 68 65 80 90   Temp: 37.3 °C (99.1 °F) 36.8 °C (98.2 °F)     Resp: 16 14     Height:       Weight:       SpO2: 93% 93% 88% 93%     Body mass index is 33.27 kg/(m^2). Weight: (!) 134 kg (295 lb 6.7 oz)  Oxygen Therapy:  Pulse Oximetry: 93 %, O2 (LPM): 1, O2 Delivery: None (Room Air)    Physical Exam      Constitutional: He is oriented to person, place, and time and well-developed, well-nourished, and in no distress.   Eyes: Conjunctivae and EOM are normal. Pupils are equal, round, and reactive to light.   Neck: Normal range of motion. Neck supple.   Cardiovascular: Normal rate, regular rhythm, normal heart sounds and intact distal pulses.  Exam reveals no gallop and no friction rub.     No murmur heard.  Pulmonary/Chest: No respiratory distress. He has no rales. He exhibits tenderness.   Abdominal: Soft. Bowel sounds are normal. He exhibits no distension and no mass. There is no tenderness. There is no rebound and no  guarding.   Musculoskeletal: Normal range of motion. He exhibits no edema or tenderness.   Neurological: He is alert and oriented to person, place, and time. No cranial nerve deficit.     Respiratory:     Respiration: 14, Pulse Oximetry: 93 %    Chest Tube Drains:          Invalid input(s): [id number]    HemoDynamics:  Pulse: 90, Heart Rate (Monitored): 65 NIBP: 134/77 mmHg      Neuro:      Fluids:    Date 17 0700 - 17 0659   Shift 3304-0505 9733-3890 2280-0097 24 Hour Total   I  N  T  A  K  E   P.O. 300   300      P.O. 300   300    Shift Total 300   300   O  U  T  P  U  T   Urine 550   550      Number of Times Voided 1 x   1 x      Void (ml) 550   550    Shift Total 550   550   NET -250   -250        Intake/Output Summary (Last 24 hours) at 17 1338  Last data filed at 17 1000   Gross per 24 hour   Intake 2274.13 ml   Output   2600 ml   Net -325.87 ml       Weight: (!) 134 kg (295 lb 6.7 oz)  Body mass index is 33.27 kg/(m^2).    Recent Labs      17   0235  17   0410   SODIUM  141  138   POTASSIUM  3.9  3.9   CHLORIDE  113*  108   CO2  21  22   BUN  14  11   CREATININE  0.97  0.93   MAGNESIUM  2.0  1.9   CALCIUM  7.5*  7.8*       GI/Nutrition:  Recent Labs      17   0235  17   0410   ALTSGPT  24  21   ASTSGOT  21  15   ALKPHOSPHAT  63  66   TBILIRUBIN  0.6  0.5   GLUCOSE  95  89       Heme:  Recent Labs      17   1523  17   0235  17   0850  17   1442  17   0410   RBC   --   3.50*   --    --   3.48*   HEMOGLOBIN   --   10.6*   --    --   10.5*   HEMATOCRIT   --   32.3*   --    --   32.3*   PLATELETCT   --   133*   --    --   149*   PROTHROMBTM  14.8*   --    --    --    --    APTT  25.3  39.5*  37.7*  34.5  29.1   INR  1.12   --    --    --    --        Infectious Disease:  Temp  Av.2 °C (98.9 °F)  Min: 36.8 °C (98.2 °F)  Max: 37.5 °C (99.5 °F)  Recent Labs      17   0235  17   0410   WBC  7.7  6.1   NEUTSPOLYS  56.60   59.50   LYMPHOCYTES  28.50  24.90   MONOCYTES  8.90  7.60   EOSINOPHILS  4.60  6.60   BASOPHILS  0.70  0.70   ASTSGOT  21  15   ALTSGPT  24  21   ALKPHOSPHAT  63  66   TBILIRUBIN  0.6  0.5       Meds:  • ketorolac  30 mg     • rivaroxaban  15 mg      Followed by   • [START ON 7/28/2017] rivaroxaban  20 mg     • senna-docusate  2 Tab      And   • polyethylene glycol/lytes  1 Packet      And   • magnesium hydroxide  30 mL      And   • bisacodyl  10 mg     • Respiratory Care per Protocol       • acetaminophen  650 mg     • oxycodone immediate-release  5 mg      Or   • oxycodone immediate-release  10 mg     • enalaprilat  1.25 mg     • ondansetron  4 mg     • ondansetron  4 mg     • promethazine  12.5-25 mg     • promethazine  12.5-25 mg     • prochlorperazine  5-10 mg     • guaifenesin DM  10 mL     • ipratropium-albuterol  3 mL            Imaging:  ECHOCARDIOGRAM-COMP W/ CONT   Final Result      LE Venous Duplex-DVT ([facility] only)   Preliminary Result      DX-KNEE COMPLETE 4+ RIGHT   Final Result      1.  No evidence of acute fracture or dislocation.      2.  Prior anterior cruciate ligament graft repair.      3.  Tricompartment degenerative change.      4.  Multiple intra-articular ossific loose bodies.      CT-CTA CHEST PULMONARY ARTERY W/ RECONS   Final Result   Addendum 1 of 1   These findings were discussed with CIELO OLIVARES on 7/5/2017 4:41 PM.      Final      1.  RIGHT ventricular strain extensive acute bilateral pulmonary emboli with findings raising concern for RIGHT ventricular strain.   2.  No pneumonia or pneumothorax.               OUTSIDE IMAGES-DX CHEST   Preliminary Result          Problem and Plan:  * Pulmonary embolism (CMS-HCC) (present on admission)  Assessment & Plan  pt presented with sudden onset chest pain to outside facility; has h/o recent immobility after ACL injury with h/o LE pain and swelling which resolved recently; EKG shows S1Q3T3 changes; CXR from outside facility  shows mild pulmonary congestion; ; CTPE shows extensive acute b/l PEs with e/o RV strain; full dose tPA administered in ER; will admit to ICU for monitoring post-tPA; LE duplex ordered to evaluate for DVT; start heparin gtt post-tpa and will need to transition to oral anticoagulation 24 hours post-tPA. Pt was started in heparin and switch to Xarelto.     Non-ST elevation myocardial infarction (NSTEMI) due to mismatch of myocardial oxygen supply and demand (CMS-HCC) (present on admission)  Assessment & Plan  2/2 demand ischemia from PE; trop trended up from outside facility; EKG shows NSR with S1Q3T3; will trend trop to monitor for improvement with treatment of PE    Acute hypoxemic respiratory failure (CMS-HCC) (present on admission)  Assessment & Plan  2/2 PE; mild wheezing noted on exam;on 4L NC; RT protocol; Q6H duonebs    MARJAN (acute kidney injury) (CMS-HCC) (present on admission)  Assessment & Plan  Creat 1.39 in [place]; likely prerenal; will hydrate    Hypokalemia (present on admission)  Assessment & Plan  K 3.8 in [place]; replete prn      DISPO: Stable to transfer to [facility]    CODE STATUS: FULL    Quality Measures:  Sanchez Catheter: not indicated  DVT Prophylaxis: Xarelto  Lines: PIV

## 2017-07-07 NOTE — DISCHARGE PLANNING
Informed by [facility] in [facility] that the pt's Xeralto is covered by his insurance and there is no co-pay. Updated Dr. Odonnell.

## 2017-07-07 NOTE — DISCHARGE PLANNING
Care Transition Team Assessment    Information for this assessment was comprised of interviews with the ppt as well as chart information. The pt states he was in the process of making his way to [place] for work when he had chest pains. The pt states he is not  and does not have any children. He has been staying with friends while he is making his way to [place]. The pt states he has been consuming a considerable amount os alcohol daily for the last 6 months. The pt was offer treatment resources but declined. The pt states he plans on not consuming as much alcohol in the future. Prior to admittance the pt was independent with all ADLs/IADLs. The pt states he has a friends that live in the [facility] that can provide him with a ride home.       Information Source  Orientation : Oriented x 4  Information Given By: Patient  Informant's Name: Angelo  Who is responsible for making decisions for patient? : Patient    Readmission Evaluation  Is this a readmission?: No    Elopement Risk  Legal Hold: No  Ambulatory or Self Mobile in Wheelchair: Yes  Disoriented: No  Psychiatric Symptoms: None  History of Wandering: No  Elopement this Admit: No  Vocalizing Wanting to Leave: No  Displays Behaviors, Body Language Wanting to Leave: No-Not at Risk for Elopement  Elopement Risk: Not at Risk for Elopement    Interdisciplinary Discharge Planning  Primary Care Physician: NO  Lives with - Patient's Self Care Capacity: Alone and Able to Care For Self  Patient or legal guardian wants to designate a caregiver (see row info): No  Support Systems: Spouse / Significant Other, Family Member(s)  Housing / Facility: [address]  Do You Take your Prescribed Medications Regularly: Yes  Able to Return to Previous ADL's: Yes  Mobility Issues: No  Prior Services: None  Patient Expects to be Discharged to:: Home  Assistance Needed: No  Durable Medical Equipment: Not Applicable    Discharge Preparedness  What is your plan after  "discharge?: Uncertain - pending medical team collaboration  What are your discharge supports?: Other (comment) (some friends)  Prior Functional Level: Ambulatory, Drives Self, Independent with Activities of Daily Living, Independent with Medication Management  Difficulty with ADLs: None  Difficulty with IADLs: None    Functional Assessment  Prior Functional Level: Ambulatory, Drives Self, Independent with Activities of Daily Living, Independent with Medication Management    Finances  Financial Barriers to Discharge: No  Prescription Coverage: Yes    Vision / Hearing Impairment  Vision Impairment : No  Hearing Impairment : No    Values / Beliefs / Concerns  Values / Beliefs Concerns : No    Advance Directive  Advance Directive?: None  Advance Directive offered?: AD Booklet refused    Domestic Abuse  Have you ever been the victim of abuse or violence?: No  Physical Abuse or Sexual Abuse: No  Verbal Abuse or Emotional Abuse: No  Possible Abuse Reported to:: Not Applicable    Psychological Assessment  History of Substance Abuse: Alcohol  Date Last Used - Alcohol: prior to admittance  Substance Abuse Comments: Pt states he has been \"out of control\" the last 6 months  History of Psychiatric Problems: No  Non-compliant with Treatment: No  Newly Diagnosed Illness: Yes    Discharge Risks or Barriers  Discharge risks or barriers?: Substance abuse, Transportation  Patient risk factors: Homeless, Substance abuse (Alcohol use)    Anticipated Discharge Information  Anticipated discharge disposition: Discharge needs currently unknown        "

## 2017-07-07 NOTE — PROGRESS NOTES
Pulmonary Critical Care Progress Note        Date of service:  7/7/2017    Interval Events:  24 hour interval history reviewed.  Reason for visit:  Acute PE  Seen with R Gold Team      Xarelto started yesterday  Alert and oriented x4, complains of left-sided rib pain, received Oxy 10 without relief  SR  On room air now  Tolerating regular diet  Self-diuresing well  Toradol 30 IV q6 for pain relief      Improved SOB.  No hemoptysis.  Mild dry cough.  No wheezing.  Pleuritic CP on left lateral lower chest  No angina, palp, syncope  Right leg pain and edema are improved  No HA, Sz, diplopia  No N/V/P/D  No dysuria or hematuria  No rash    The complete AMA/CMS system review does not reveal additional positive findings.      PFSH:  No change.    Respiratory:     Pulse Oximetry: 93 %  CTA reviewed:  Large bilateral PE with right heart strain.  Clear lungs  RA  No dullness    HemoDynamics:  Pulse: 68, Heart Rate (Monitored): 65  NIBP: 123/66 mmHg     SR  No edema in right leg  Left leg normal    Recent Labs      07/05/17   1523  07/05/17   2300  07/06/17   0235  07/06/17   0850   TROPONINI  0.69*  2.07*  1.01*  0.33*   BNPBTYPENAT  184*   --    --    --        Neuro:  Awake and alert  No focal weakness  Fully oriented    Fluids:  Intake/Output       07/05/17 0700 - 07/06/17 0659 07/06/17 0700 - 07/07/17 0659 07/07/17 0700 - 07/08/17 0659      2836-5098 0612-7922 Total 7860-8622 4130-6810 Total 0909-9141 0560-6239 Total       Intake    P.O.  --  1850 1850  200  1350 1550  --  -- --    P.O. -- 1850 4375 952 2997 1550 -- -- --    I.V.  --  1038.9 1038.9  1192.8  22.1 1214.9  --  -- --    Heparin Volume -- 213.9 213.9 292.8 22.1 314.9 -- -- --    IV Volume (NS) -- 825 825 900 -- 900 -- -- --    Total Intake -- 2888.9 2888.9 1392.8 1372.1 2764.9 -- -- --       Output    Urine  --  2100 2100 1050 1000 2050  --  -- --    Number of Times Voided -- 4 x 4 x 3 x 1 x 4 x -- -- --    Void (ml) -- 2100 2100 1050 1000 2050 -- -- --     Stool  --  -- --  --  -- --  --  -- --    Number of Times Stooled -- 0 x 0 x -- -- -- -- -- --    Total Output -- 2100 2100 1050 1000  -- -- --       Net I/O     -- 788.9 788.9 342.8 372.1 714.9 -- -- --        Weight: (!) 134 kg (295 lb 6.7 oz)  Recent Labs      17   SODIUM  141  138   POTASSIUM  3.9  3.9   CHLORIDE  113*  108   CO2  21  22   BUN  14  11   CREATININE  0.97  0.93   MAGNESIUM  2.0  1.9   CALCIUM  7.5*  7.8*       GI/Nutrition:  Abd soft ND/NT  No N/V/P    Liver Function  Recent Labs      17   ALTSGPT  24  21   ASTSGOT  21  15   ALKPHOSPHAT  63  66   TBILIRUBIN  0.6  0.5   GLUCOSE  95  89       Heme:  Recent Labs      17   1523  17   0850  17   1442  17   RBC   --   3.50*   --    --   3.48*   HEMOGLOBIN   --   10.6*   --    --   10.5*   HEMATOCRIT   --   32.3*   --    --   32.3*   PLATELETCT   --   133*   --    --   149*   PROTHROMBTM  14.8*   --    --    --    --    APTT  25.3  39.5*  37.7*  34.5  29.1   INR  1.12   --    --    --    --        Infectious Disease:  Temp  Av.1 °C (98.8 °F)  Min: 36.8 °C (98.2 °F)  Max: 37.5 °C (99.5 °F)    Recent Labs      17   WBC  7.7  6.1   NEUTSPOLYS  56.60  59.50   LYMPHOCYTES  28.50  24.90   MONOCYTES  8.90  7.60   EOSINOPHILS  4.60  6.60   BASOPHILS  0.70  0.70   ASTSGOT  21  15   ALTSGPT  24  21   ALKPHOSPHAT  63  66   TBILIRUBIN  0.6  0.5     Current Facility-Administered Medications   Medication Dose Frequency Provider Last Rate Last Dose   • rivaroxaban (XARELTO) tablet 15 mg  15 mg BID Aric Odonnell M.D.   15 mg at 17 1735    Followed by   • [START ON 2017] rivaroxaban (XARELTO) tablet 20 mg  20 mg DAILY Aric Odonnell M.D.       • senna-docusate (PERICOLACE or SENOKOT S) 8.6-50 MG per tablet 2 Tab  2 Tab BID Dione Hernandez M.D.   2 Tab at 17    And   •  polyethylene glycol/lytes (MIRALAX) PACKET 1 Packet  1 Packet QDAY PRN Dione Hernandez M.D.        And   • magnesium hydroxide (MILK OF MAGNESIA) suspension 30 mL  30 mL QDAY PRN Dione Hernandez M.D.        And   • bisacodyl (DULCOLAX) suppository 10 mg  10 mg QDAY PRN Dione Hernandez M.D.       • Respiratory Care per Protocol   Continuous RT Dione Hernandez M.D.       • acetaminophen (TYLENOL) tablet 650 mg  650 mg Q6HRS PRN Dione Hernandez M.D.       • morphine (pf) 4 mg/ml injection 2-4 mg  2-4 mg Q4HRS PRN Dione Hernandez M.D.   4 mg at 07/05/17 2316   • oxycodone immediate-release (ROXICODONE) tablet 5 mg  5 mg Q4HRS PRN Dione Hernandez M.D.        Or   • oxycodone immediate-release (ROXICODONE) tablet 10 mg  10 mg Q4HRS PRN Dione Hernandez M.D.   10 mg at 07/06/17 2011   • enalaprilat (VASOTEC) injection 1.25 mg  1.25 mg Q6HRS PRJHONATAN Hernandez M.D.       • ondansetron (ZOFRAN) syringe/vial injection 4 mg  4 mg Q4HRS PRJHONATAN Hernandez M.D.       • ondansetron (ZOFRAN ODT) dispertab 4 mg  4 mg Q4HRS PRN Dione Hernandez M.D.       • promethazine (PHENERGAN) tablet 12.5-25 mg  12.5-25 mg Q4HRS PRJHONATAN Hernandez M.D.       • promethazine (PHENERGAN) suppository 12.5-25 mg  12.5-25 mg Q4HRS PRJHONATAN Hernandez M.D.       • prochlorperazine (COMPAZINE) injection 5-10 mg  5-10 mg Q4HRS PRJHONATAN Hernandez M.D.       • guaifenesin DM (ROBITUSSIN DM) 100-10 MG/5ML syrup 10 mL  10 mL Q6HRS PRN Dione Hernandez M.D.       • ipratropium-albuterol (DUONEB) nebulizer solution 3 mL  3 mL Q6HRS PRN (RT) Dione Hernandez M.D.         Last reviewed on 7/5/2017  4:58 PM by Terrence Alcocer    Quality  Measures:  Labs reviewed, Medications reviewed and Radiology images reviewed                        Assessment and Plan:    Acute hypoxemic respiratory failure   - resolving  Acute bilateral PE with right heart  strain   - S/P alteplase   - cont rivaroxaban   - echocardiogram with decreased RV function   - will give short course of Toradol for pleuritic chest pain  Acute RLE DVT - cont anticoagulation    OK to transfer out of ICU.  [facility] will continue to follow.    Discussed with RN, RT, ER physician, Resident      Kristi BERNARD (Scribe), am scribing for, and in the presence of, Aric Lozano M.D.    Electronically signed by: Kristi Hook (Marcin), 7/7/2017    IAric M.D. personally performed the services described in this documentation, as scribed by Kristi Hook in my presence, and it is both accurate and complete.

## 2017-07-07 NOTE — PROGRESS NOTES
Report called to [id number] RN. All questions answered. Patient and family updated on change of room plans. Chart and all belongings packed for transport.

## 2017-07-07 NOTE — PROGRESS NOTES
Transfer summery     53 y/o M w/ PMH of Right ACL injury and repair 20 years ago was taken to the [facility] ED c/o sudden onset chest pain, diaphoresis and dyspnea which he experiens while camping. Before then, pt was immobile for 2 days 2/2 to knee pain. Pt trops were at 0.9 and with hypoxemia requiring 15 and then later 8-10 L/min O2. He recived ASA and 2 nitro tabs after which his BP dropped and his was transferred to [facility] were EKG showed S1Q3T3 pattern and CTPE here showed bilateral diffuse PE. Pt was given tPA at ED and admitted to the ICU where his chest pain improved. RLE venous doppler showed distal external iliac and proximal common femoral and proximal greater saphenous veins are dilated with throumbus. Pt was started on heparin and switched to Xarelto. Left leg swelling has diminished. He is currently on 4 a letter NC and speaks in full sentenses. No c/o SOB, lower extremity pain, dizziness, chest pain, fever, diaphoresis or palpitation.     Case discussed with [facility] who accept patient for transfer.

## 2017-07-08 PROBLEM — M25.569 KNEE PAIN: Status: ACTIVE | Noted: 2017-07-08

## 2017-07-08 LAB
ANION GAP SERPL CALC-SCNC: 3 MMOL/L (ref 0–11.9)
BASOPHILS # BLD AUTO: 0.6 % (ref 0–1.8)
BASOPHILS # BLD: 0.04 K/UL (ref 0–0.12)
BUN SERPL-MCNC: 12 MG/DL (ref 8–22)
CALCIUM SERPL-MCNC: 8.3 MG/DL (ref 8.5–10.5)
CHLORIDE SERPL-SCNC: 107 MMOL/L (ref 96–112)
CO2 SERPL-SCNC: 28 MMOL/L (ref 20–33)
CREAT SERPL-MCNC: 1.01 MG/DL (ref 0.5–1.4)
EOSINOPHIL # BLD AUTO: 0.46 K/UL (ref 0–0.51)
EOSINOPHIL NFR BLD: 7.2 % (ref 0–6.9)
ERYTHROCYTE [DISTWIDTH] IN BLOOD BY AUTOMATED COUNT: 39.8 FL (ref 35.9–50)
GFR SERPL CREATININE-BSD FRML MDRD: >60 ML/MIN/1.73 M 2
GLUCOSE SERPL-MCNC: 124 MG/DL (ref 65–99)
HCT VFR BLD AUTO: 34.7 % (ref 42–52)
HGB BLD-MCNC: 11.4 G/DL (ref 14–18)
IMM GRANULOCYTES # BLD AUTO: 0.05 K/UL (ref 0–0.11)
IMM GRANULOCYTES NFR BLD AUTO: 0.8 % (ref 0–0.9)
LYMPHOCYTES # BLD AUTO: 1.37 K/UL (ref 1–4.8)
LYMPHOCYTES NFR BLD: 21.4 % (ref 22–41)
MAGNESIUM SERPL-MCNC: 2.1 MG/DL (ref 1.5–2.5)
MCH RBC QN AUTO: 30 PG (ref 27–33)
MCHC RBC AUTO-ENTMCNC: 32.9 G/DL (ref 33.7–35.3)
MCV RBC AUTO: 91.3 FL (ref 81.4–97.8)
MONOCYTES # BLD AUTO: 0.42 K/UL (ref 0–0.85)
MONOCYTES NFR BLD AUTO: 6.6 % (ref 0–13.4)
NEUTROPHILS # BLD AUTO: 4.07 K/UL (ref 1.82–7.42)
NEUTROPHILS NFR BLD: 63.4 % (ref 44–72)
NRBC # BLD AUTO: 0 K/UL
NRBC BLD AUTO-RTO: 0 /100 WBC
PLATELET # BLD AUTO: 202 K/UL (ref 164–446)
PMV BLD AUTO: 9.6 FL (ref 9–12.9)
POTASSIUM SERPL-SCNC: 4.4 MMOL/L (ref 3.6–5.5)
RBC # BLD AUTO: 3.8 M/UL (ref 4.7–6.1)
SODIUM SERPL-SCNC: 138 MMOL/L (ref 135–145)
WBC # BLD AUTO: 6.4 K/UL (ref 4.8–10.8)

## 2017-07-08 PROCEDURE — 700111 HCHG RX REV CODE 636 W/ 250 OVERRIDE (IP): Performed by: INTERNAL MEDICINE

## 2017-07-08 PROCEDURE — 36415 COLL VENOUS BLD VENIPUNCTURE: CPT

## 2017-07-08 PROCEDURE — 80048 BASIC METABOLIC PNL TOTAL CA: CPT

## 2017-07-08 PROCEDURE — 700102 HCHG RX REV CODE 250 W/ 637 OVERRIDE(OP): Performed by: INTERNAL MEDICINE

## 2017-07-08 PROCEDURE — 85025 COMPLETE CBC W/AUTO DIFF WBC: CPT

## 2017-07-08 PROCEDURE — 83735 ASSAY OF MAGNESIUM: CPT

## 2017-07-08 PROCEDURE — A9270 NON-COVERED ITEM OR SERVICE: HCPCS | Performed by: INTERNAL MEDICINE

## 2017-07-08 PROCEDURE — 770006 HCHG ROOM/CARE - MED/SURG/GYN SEMI*

## 2017-07-08 PROCEDURE — 99233 SBSQ HOSP IP/OBS HIGH 50: CPT | Performed by: INTERNAL MEDICINE

## 2017-07-08 RX ADMIN — STANDARDIZED SENNA CONCENTRATE AND DOCUSATE SODIUM 2 TABLET: 8.6; 5 TABLET, FILM COATED ORAL at 10:08

## 2017-07-08 RX ADMIN — KETOROLAC TROMETHAMINE 30 MG: 30 INJECTION, SOLUTION INTRAMUSCULAR at 07:10

## 2017-07-08 RX ADMIN — RIVAROXABAN 15 MG: 15 TABLET, FILM COATED ORAL at 10:07

## 2017-07-08 RX ADMIN — OXYCODONE HYDROCHLORIDE 5 MG: 5 TABLET ORAL at 13:24

## 2017-07-08 RX ADMIN — STANDARDIZED SENNA CONCENTRATE AND DOCUSATE SODIUM 2 TABLET: 8.6; 5 TABLET, FILM COATED ORAL at 21:13

## 2017-07-08 RX ADMIN — KETOROLAC TROMETHAMINE 30 MG: 30 INJECTION, SOLUTION INTRAMUSCULAR at 14:21

## 2017-07-08 RX ADMIN — KETOROLAC TROMETHAMINE 30 MG: 30 INJECTION, SOLUTION INTRAMUSCULAR at 23:26

## 2017-07-08 RX ADMIN — KETOROLAC TROMETHAMINE 30 MG: 30 INJECTION, SOLUTION INTRAMUSCULAR at 18:14

## 2017-07-08 RX ADMIN — RIVAROXABAN 15 MG: 15 TABLET, FILM COATED ORAL at 21:13

## 2017-07-08 ASSESSMENT — LIFESTYLE VARIABLES: DO YOU DRINK ALCOHOL: NO

## 2017-07-08 ASSESSMENT — ENCOUNTER SYMPTOMS
SORE THROAT: 0
MEMORY LOSS: 0
BRUISES/BLEEDS EASILY: 0
HEADACHES: 1
NAUSEA: 0
DIARRHEA: 0
ABDOMINAL PAIN: 0
WEAKNESS: 0
BLURRED VISION: 0
PALPITATIONS: 1
HEARTBURN: 0
CONSTIPATION: 1
SENSORY CHANGE: 0
DIZZINESS: 1
MYALGIAS: 0
SPEECH CHANGE: 0
FOCAL WEAKNESS: 0
PALPITATIONS: 0
FEVER: 0
WHEEZING: 0
SHORTNESS OF BREATH: 1
VOMITING: 0
EYE PAIN: 0
DIAPHORESIS: 0
NERVOUS/ANXIOUS: 0
NERVOUS/ANXIOUS: 1
TREMORS: 0
CONSTIPATION: 0
STRIDOR: 0
TINGLING: 0
SEIZURES: 0
COUGH: 0
ORTHOPNEA: 0
LOSS OF CONSCIOUSNESS: 0
CHILLS: 0

## 2017-07-08 ASSESSMENT — PAIN SCALES - GENERAL
PAINLEVEL_OUTOF10: 3
PAINLEVEL_OUTOF10: 3
PAINLEVEL_OUTOF10: 1
PAINLEVEL_OUTOF10: 4

## 2017-07-08 NOTE — FACE TO FACE
Face to Face Supporting Documentation - Home Health    The encounter with this patient was in whole or in part the primary reason for home health admission.    Date of encounter:   Patient:                    MRN:                       YOB: 2015  Thony Crow  [id number]  1965     Home health to see patient for:  Comment: Bilateral PE- Provoked from DVT-Needs HOME O2.    Skilled need for:  Comment: Bilateral PE- Needs Home 02, provoked DVT    Skilled nursing interventions to include:  Comment: Provoked DVT causing bilateral PE- NEEDS 02    Homebound status evidenced by:  Need the aid of supportive devices such as crutches, canes, wheelchairs or walkers. Leaving home requires a considerable and taxing effort. There is a normal inability to leave the home.    Community Physician to provide follow up care: Pcp Not In Computer     Optional Interventions? No      I certify the face to face encounter for this home health care referral meets the CMS requirements and the encounter/clinical assessment with the patient was, in whole, or in part, for the medical condition(s) listed above, which is the primary reason for home health care. Based on my clinical findings: the service(s) are medically necessary, support the need for home health care, and the homebound criteria are met.  I certify that this patient has had a face to face encounter by myself.  Osito Lopez M.D. - NPI: [id number]

## 2017-07-08 NOTE — PROGRESS NOTES
Patient arrivers on unit with transport staff via wheelchair.   Able to ambulate from chair to bed without difficulty.    A&O x 4. Oriented to surroundings. Vital signs, height, and weight attained by CNA.     AM RN Assessment reviewed. Agree with assessment. Telephone report received in hours prior to patient's arrival.    Plan of care discussed. Denies pain. All needs met at this time. Will continue to monitor.

## 2017-07-08 NOTE — NON-PROVIDER
[facility] Interval Note    Name Thony Crow     1965   Age/Sex 52 y.o. male   MRN [id number]   Code Status FULL CODE     After 5PM or if no immediate response to page, please call for cross-coverage  Attending/Team: Dr. Welch / Boris Use Tiger Text to page  6AM-5PM  Call [phone] to page afterhours   1st Call - Day Intern (R1):   Dr. Thompson 2nd Call - Day Sr. Resident (R2/R3):   Dr. Lopez         Chief complaint/ reason for interval visit (Primary Diagnosis)   Observation of Pulmonary Embolism    Interval Problem Daily Status Update    Pulmonary embolism - patient still has some decrease in lung function, but is vastly improved since admission. Patient was able to ambulate, but his O2 sats dropped into the 80s.   Headache - patient had headache for the first time during hospital stay. Toradol seemed to help  Anxiety - patient reports feeling anxious as he was previously healthy and this diagnosis has affected him dramatically. Has caused some episodes of heart palpitations      Review of Systems   Constitutional: Negative for diaphoresis.   Cardiovascular: Positive for palpitations. Negative for chest pain.   Gastrointestinal: Positive for constipation.   Neurological: Positive for dizziness and headaches. Negative for weakness.   Psychiatric/Behavioral: The patient is nervous/anxious.        Disposition  Inpatient    Quality Measures  Medications reviewed, Labs reviewed, Radiology images reviewed and EKG reviewed  Sanchez catheter: No Sanchez                        Physical Exam       Filed Vitals:    17 1950 17 0355 17 0500 17 0645   BP: 108/69 105/57  121/76   Pulse: 81 76  67   Temp: 36.5 °C (97.7 °F) 36.3 °C (97.4 °F)  36.9 °C (98.4 °F)   Resp: 18 18  18   Height:       Weight:   134.6 kg (296 lb 11.8 oz)    SpO2: 90% 91%  92%     Body mass index is 33.42 kg/(m^2). Weight: (!) 134.6 kg (296 lb 11.8 oz)  Oxygen Therapy:  Pulse  Oximetry: 92 %, O2 (LPM): 0, O2 Delivery: None (Room Air)    Physical Exam   Constitutional: He is oriented to person, place, and time and well-developed, well-nourished, and in no distress. No distress.   HENT:   Head: Normocephalic.   Neck: Normal range of motion. Neck supple.   Cardiovascular: Normal rate, regular rhythm and normal heart sounds.    Pulmonary/Chest: Effort normal and breath sounds normal.   Abdominal: Soft.   Musculoskeletal: He exhibits edema.   Neurological: He is alert and oriented to person, place, and time. No cranial nerve deficit. He exhibits normal muscle tone.   Skin: He is not diaphoretic.         Lab Data Review:      7/8/2017  12:42 PM    Recent Labs      07/06/17 0235 07/07/17 0410 07/08/17 0316   SODIUM  141  138  138   POTASSIUM  3.9  3.9  4.4   CHLORIDE  113*  108  107   CO2  21  22  28   BUN  14  11  12   CREATININE  0.97  0.93  1.01   MAGNESIUM  2.0  1.9  2.1   CALCIUM  7.5*  7.8*  8.3*       Recent Labs      07/06/17 0235 07/07/17 0410 07/08/17 0316   ALTSGPT  24  21   --    ASTSGOT  21  15   --    ALKPHOSPHAT  63  66   --    TBILIRUBIN  0.6  0.5   --    GLUCOSE  95  89  124*       Recent Labs      07/05/17   1523  07/06/17   0235  07/06/17   0850  07/06/17   1442  07/07/17 0410  07/08/17 0316   RBC   --   3.50*   --    --   3.48*  3.80*   HEMOGLOBIN   --   10.6*   --    --   10.5*  11.4*   HEMATOCRIT   --   32.3*   --    --   32.3*  34.7*   PLATELETCT   --   133*   --    --   149*  202   PROTHROMBTM  14.8*   --    --    --    --    --    APTT  25.3  39.5*  37.7*  34.5  29.1   --    INR  1.12   --    --    --    --    --        Recent Labs      07/06/17 0235 07/07/17 0410 07/08/17 0316   WBC  7.7  6.1  6.4   NEUTSPOLYS  56.60  59.50  63.40   LYMPHOCYTES  28.50  24.90  21.40*   MONOCYTES  8.90  7.60  6.60   EOSINOPHILS  4.60  6.60  7.20*   BASOPHILS  0.70  0.70  0.60   ASTSGOT  21  15   --    ALTSGPT  24  21   --    ALKPHOSPHAT  63  66   --       TBILIRUBIN  0.6  0.5   --            Assessment/Plan     * Pulmonary embolism (CMS-HCC) (present on admission)  Assessment & Plan  - Patient had stable ICU course   - Patient lung function continues to improve  - Initial knee injury is improving, with only residual swelling around the knee present  - Continue anticoagulation therapy    Non-ST elevation myocardial infarction (NSTEMI) due to mismatch of myocardial oxygen supply and demand (CMS-HCC) (present on admission)  Assessment & Plan  - Troponin levels have dropped from 2.07 on admission to 0.33 6. July.   - Echo showed mild dilatation in right ventricle  - NSTEMI follow up as outpatient    Acute hypoxemia (CMS-HCC) (present on admission)  Assessment & Plan  - Hypoxemia has greatly improved since admission  - Patient O2 saturation drops upon ambulatory effort  - Provide oxygen when necessary for the rest of hospitalization   - Prescribe oxygen for home use until perfusion improves  - Follow up with outpatient clinic

## 2017-07-08 NOTE — PROGRESS NOTES
Pt axo x4 VSS on room air. Pain controlled with oxy 5 mg and sched toradol. Ambulating independently in room and halls. Mild SOB on exertion, O2 stable. Voiding in bathroom. Continue with plan of care.

## 2017-07-08 NOTE — PROGRESS NOTES
"Assumed care of patient at 0700 . Received report from RN. Patient is AOX4 . Assessment complete. Labs reviewed.Patient and RN discussed plan of care. Patient questions answered. Patient needs are met at this time. Bed in lowest and locked position. Upper bed rails up.  Call light is within reach. Hourly rounding in place.  /76 mmHg  Pulse 67  Temp(Src) 36.9 °C (98.4 °F)  Resp 18  Ht 2.007 m (6' 7.02\")  Wt 134.6 kg (296 lb 11.8 oz)  BMI 33.42 kg/m2  SpO2 92%    "

## 2017-07-08 NOTE — FACE TO FACE
Face to Face Supporting Documentation - Home Health    The encounter with this patient was in whole or in part the primary reason for home health admission.    Date of encounter:   Patient:                    MRN:                       YOB: 2017  Thony Crow  [id number]  1965     Home health to see patient for:  Physical Therapy evaluation and treatment    Skilled need for:  Exacerbation of Chronic Disease State Provoked DVT-Bilateral PE.    Skilled nursing interventions to include:  Comment: Provoked DVT-Bilateral PE    Homebound status evidenced by:  Need the aid of supportive devices such as crutches, canes, wheelchairs or walkers. Leaving home requires a considerable and taxing effort. There is a normal inability to leave the home.    Community Physician to provide follow up care: Pcp Not In Computer     Optional Interventions? No      I certify the face to face encounter for this home health care referral meets the CMS requirements and the encounter/clinical assessment with the patient was, in whole, or in part, for the medical condition(s) listed above, which is the primary reason for home health care. Based on my clinical findings: the service(s) are medically necessary, support the need for home health care, and the homebound criteria are met.  I certify that this patient has had a face to face encounter by myself.  Osito Lopez M.D. - NPI: [id number]

## 2017-07-08 NOTE — CARE PLAN
Problem: Infection  Goal: Will remain free from infection  Outcome: PROGRESSING AS EXPECTED  Pt educated on the s/s of infection, verbalizes understanding, will continue to monitor    Problem: Pain Management  Goal: Pain level will decrease to patient’s comfort goal  Outcome: PROGRESSING AS EXPECTED  Patient denies pain at this time. Will continue to reassess.

## 2017-07-09 VITALS
HEIGHT: 78 IN | DIASTOLIC BLOOD PRESSURE: 82 MMHG | TEMPERATURE: 97.2 F | WEIGHT: 296.74 LBS | BODY MASS INDEX: 34.33 KG/M2 | OXYGEN SATURATION: 94 % | HEART RATE: 83 BPM | RESPIRATION RATE: 18 BRPM | SYSTOLIC BLOOD PRESSURE: 132 MMHG

## 2017-07-09 LAB
ALBUMIN SERPL BCP-MCNC: 3.1 G/DL (ref 3.2–4.9)
ALBUMIN/GLOB SERPL: 1 G/DL
ALP SERPL-CCNC: 68 U/L (ref 30–99)
ALT SERPL-CCNC: 28 U/L (ref 2–50)
ANION GAP SERPL CALC-SCNC: 7 MMOL/L (ref 0–11.9)
AST SERPL-CCNC: 16 U/L (ref 12–45)
BASOPHILS # BLD AUTO: 0.7 % (ref 0–1.8)
BASOPHILS # BLD: 0.05 K/UL (ref 0–0.12)
BILIRUB SERPL-MCNC: 0.5 MG/DL (ref 0.1–1.5)
BUN SERPL-MCNC: 11 MG/DL (ref 8–22)
CALCIUM SERPL-MCNC: 8.7 MG/DL (ref 8.5–10.5)
CHLORIDE SERPL-SCNC: 108 MMOL/L (ref 96–112)
CO2 SERPL-SCNC: 24 MMOL/L (ref 20–33)
CREAT SERPL-MCNC: 0.95 MG/DL (ref 0.5–1.4)
EOSINOPHIL # BLD AUTO: 0.49 K/UL (ref 0–0.51)
EOSINOPHIL NFR BLD: 6.4 % (ref 0–6.9)
ERYTHROCYTE [DISTWIDTH] IN BLOOD BY AUTOMATED COUNT: 39.4 FL (ref 35.9–50)
GFR SERPL CREATININE-BSD FRML MDRD: >60 ML/MIN/1.73 M 2
GLOBULIN SER CALC-MCNC: 3.1 G/DL (ref 1.9–3.5)
GLUCOSE SERPL-MCNC: 98 MG/DL (ref 65–99)
HCT VFR BLD AUTO: 35.6 % (ref 42–52)
HGB BLD-MCNC: 11.8 G/DL (ref 14–18)
IMM GRANULOCYTES # BLD AUTO: 0.05 K/UL (ref 0–0.11)
IMM GRANULOCYTES NFR BLD AUTO: 0.7 % (ref 0–0.9)
LYMPHOCYTES # BLD AUTO: 1.54 K/UL (ref 1–4.8)
LYMPHOCYTES NFR BLD: 20.3 % (ref 22–41)
MCH RBC QN AUTO: 29.8 PG (ref 27–33)
MCHC RBC AUTO-ENTMCNC: 33.1 G/DL (ref 33.7–35.3)
MCV RBC AUTO: 89.9 FL (ref 81.4–97.8)
MONOCYTES # BLD AUTO: 0.47 K/UL (ref 0–0.85)
MONOCYTES NFR BLD AUTO: 6.2 % (ref 0–13.4)
NEUTROPHILS # BLD AUTO: 5 K/UL (ref 1.82–7.42)
NEUTROPHILS NFR BLD: 65.7 % (ref 44–72)
NRBC # BLD AUTO: 0 K/UL
NRBC BLD AUTO-RTO: 0 /100 WBC
PLATELET # BLD AUTO: 258 K/UL (ref 164–446)
PMV BLD AUTO: 9.7 FL (ref 9–12.9)
POTASSIUM SERPL-SCNC: 4.2 MMOL/L (ref 3.6–5.5)
PROT SERPL-MCNC: 6.2 G/DL (ref 6–8.2)
RBC # BLD AUTO: 3.96 M/UL (ref 4.7–6.1)
SODIUM SERPL-SCNC: 139 MMOL/L (ref 135–145)
WBC # BLD AUTO: 7.6 K/UL (ref 4.8–10.8)

## 2017-07-09 PROCEDURE — A9270 NON-COVERED ITEM OR SERVICE: HCPCS | Performed by: INTERNAL MEDICINE

## 2017-07-09 PROCEDURE — 80053 COMPREHEN METABOLIC PANEL: CPT

## 2017-07-09 PROCEDURE — 99239 HOSP IP/OBS DSCHRG MGMT >30: CPT | Performed by: INTERNAL MEDICINE

## 2017-07-09 PROCEDURE — 85025 COMPLETE CBC W/AUTO DIFF WBC: CPT

## 2017-07-09 PROCEDURE — 700102 HCHG RX REV CODE 250 W/ 637 OVERRIDE(OP): Performed by: INTERNAL MEDICINE

## 2017-07-09 PROCEDURE — 700111 HCHG RX REV CODE 636 W/ 250 OVERRIDE (IP): Performed by: INTERNAL MEDICINE

## 2017-07-09 PROCEDURE — 36415 COLL VENOUS BLD VENIPUNCTURE: CPT

## 2017-07-09 RX ADMIN — KETOROLAC TROMETHAMINE 30 MG: 30 INJECTION, SOLUTION INTRAMUSCULAR at 05:48

## 2017-07-09 RX ADMIN — RIVAROXABAN 15 MG: 15 TABLET, FILM COATED ORAL at 08:46

## 2017-07-09 RX ADMIN — STANDARDIZED SENNA CONCENTRATE AND DOCUSATE SODIUM 2 TABLET: 8.6; 5 TABLET, FILM COATED ORAL at 08:46

## 2017-07-09 ASSESSMENT — PAIN SCALES - GENERAL
PAINLEVEL_OUTOF10: 2
PAINLEVEL_OUTOF10: 0

## 2017-07-09 NOTE — PROGRESS NOTES
Assumed care of pt, received report from day shift RN, pt assessed.  Pt complaining of 3/10 left rib pain, no pain medication due at this time, will medicate when possible.  Pt is A&O x4.  Pt fall risk, wearing treaded socks, bed locked and in lowest position, bed alarm is not indicated.  Pt instructed to call for assistance prior to getting OOB, pt verbalized understanding.  Call light, phone, and personal belongings within reach.

## 2017-07-09 NOTE — PROGRESS NOTES
Pt discharged home via personal vehicle. PIV DC'ed. Prescriptions given. Discharge instructions given specifically involving Xeralto and DVT, patient verbalized understanding of instructions, 2nd copy of AVS signed and placed on chart.

## 2017-07-09 NOTE — CARE PLAN
Problem: Venous Thromboembolism (VTW)/Deep Vein Thrombosis (DVT) Prevention:  Goal: Patient will participate in Venous Thrombosis (VTE)/Deep Vein Thrombosis (DVT)Prevention Measures  Intervention: Assess and monitor for anticoagulation complications  Pt on Xeralto for diagnosis.       Problem: Knowledge Deficit  Goal: Knowledge of disease process/condition, treatment plan, diagnostic tests, and medications will improve  Intervention: Explain information regarding disease process/condition, treatment plan, diagnostic tests, and medications and document in education  Pt educated on POC, given Xeralto handout.

## 2017-07-09 NOTE — PROGRESS NOTES
Received report from [facility] nurse. Assessment complete. Patient is A&Ox4, denies pain at this time, denies SOB, on RA while at rest, plan to get walking O2 sats today, up self with steady gait, no family present, PIV patent and SL. Patient is resting now. Call light within reach, bed in lowest position, treaded socks in place.

## 2017-07-09 NOTE — CARE PLAN
Problem: Communication  Goal: The ability to communicate needs accurately and effectively will improve  Outcome: PROGRESSING AS EXPECTED  Pt capable of verbalizing all needs and utilizes call light system approprietly.    Problem: Safety  Goal: Will remain free from falls  Outcome: PROGRESSING AS EXPECTED  Pt fall risk, pt wearing treaded socks, bed locked and in lowest position, bed alarm is not indicated for this pt.  Pt call light and phone within reach.

## 2017-07-09 NOTE — PROGRESS NOTES
Pulmonary Critical Care Progress Note        Date of service:  7/9/2017    Interval Events:  24 hour interval history reviewed.  Doing well. No hypoxemia.   Able to walk on RA with no hypoxemia   No chest pain       PFSH:  No change.    Respiratory:     Pulse Oximetry: 94 %  CTA reviewed:  Large bilateral PE with right heart strain.  Clear lungs  RA  No dullness    HemoDynamics:  Pulse: 83  Blood Pressure: 132/82 mmHg     SR  No edema in right leg  Left leg normal          Neuro:  Awake and alert  No focal weakness  Fully oriented    Fluids:  Intake/Output       07/07/17 0700 - 07/08/17 0659 07/08/17 0700 - 07/09/17 0659 07/09/17 0700 - 07/10/17 0659 (Discharged)      0950-8956 0459-3075 Total 5559-6880 8882-1558 Total 2717-8039 2118-7954 Total       Intake    P.O.  500  -- 500  826  -- 826  480  -- 480    P.O. 500 -- 500 826 -- 826 480 -- 480    Total Intake 500 -- 500 826 -- 826 480 -- 480       Output    Urine  1400  -- 1400  --  -- --  500  -- 500    Number of Times Voided 1 x -- 1 x -- -- -- -- -- --    Void (ml) 1400 -- 1400 -- -- -- 500 -- 500    Total Output 1400 -- 1400 -- -- -- 500 -- 500       Net I/O     -900 -- -900 826 -- 826 -20 -- -20          Recent Labs      07/07/17 0410 07/08/17 0316 07/09/17 0227   SODIUM  138  138  139   POTASSIUM  3.9  4.4  4.2   CHLORIDE  108  107  108   CO2  22  28  24   BUN  11  12  11   CREATININE  0.93  1.01  0.95   MAGNESIUM  1.9  2.1   --    CALCIUM  7.8*  8.3*  8.7       GI/Nutrition:  Abd soft ND/NT  No N/V/P    Liver Function  Recent Labs      07/07/17 0410 07/08/17 0316 07/09/17 0227   ALTSGPT  21   --   28   ASTSGOT  15   --   16   ALKPHOSPHAT  66   --   68   TBILIRUBIN  0.5   --   0.5   GLUCOSE  89  124*  98       Heme:  Recent Labs      07/07/17 0410 07/08/17   0316  07/09/17   0227   RBC  3.48*  3.80*  3.96*   HEMOGLOBIN  10.5*  11.4*  11.8*   HEMATOCRIT  32.3*  34.7*  35.6*   PLATELETCT  149*  202  258   APTT  29.1   --    --         Infectious Disease:  Temp  Av.5 °C (97.7 °F)  Min: 36.2 °C (97.2 °F)  Max: 36.7 °C (98.1 °F)    Recent Labs      17   0410  17   0316  17   0227   WBC  6.1  6.4  7.6   NEUTSPOLYS  59.50  63.40  65.70   LYMPHOCYTES  24.90  21.40*  20.30*   MONOCYTES  7.60  6.60  6.20   EOSINOPHILS  6.60  7.20*  6.40   BASOPHILS  0.70  0.60  0.70   ASTSGOT  15   --   16   ALTSGPT  21   --   28   ALKPHOSPHAT  66   --   68   TBILIRUBIN  0.5   --   0.5     Current Facility-Administered Medications   Medication Dose Frequency Provider Last Rate Last Dose   • rivaroxaban (XARELTO) tablet 15 mg  15 mg BID Aric Odonnell M.D.   15 mg at 17 0846    Followed by   • [START ON 2017] rivaroxaban (XARELTO) tablet 20 mg  20 mg DAILY Aric Odonnell M.D.       • senna-docusate (PERICOLACE or SENOKOT S) 8.6-50 MG per tablet 2 Tab  2 Tab BID Dione Hernandez M.D.   2 Tab at 17 0846    And   • polyethylene glycol/lytes (MIRALAX) PACKET 1 Packet  1 Packet QDAY PRN Dione Hernandez M.D.        And   • magnesium hydroxide (MILK OF MAGNESIA) suspension 30 mL  30 mL QDAY PRN Dione Hernandez M.D.        And   • bisacodyl (DULCOLAX) suppository 10 mg  10 mg QDAY PRN Dione Hernandez M.D.       • Respiratory Care per Protocol   Continuous RT Dione Hernandez M.D.       • acetaminophen (TYLENOL) tablet 650 mg  650 mg Q6HRS PRN Dione Hernandez M.D.       • oxycodone immediate-release (ROXICODONE) tablet 5 mg  5 mg Q4HRS PRN Dione Hernandez M.D.   5 mg at 17 1324    Or   • oxycodone immediate-release (ROXICODONE) tablet 10 mg  10 mg Q4HRS PRN Dione Hernandez M.D.   10 mg at 17 1238   • enalaprilat (VASOTEC) injection 1.25 mg  1.25 mg Q6HRS PRN Dione Hernandez M.D.       • ondansetron (ZOFRAN) syringe/vial injection 4 mg  4 mg Q4HRS PRJHONATAN Hernandez M.D.   4 mg at 17 8608   • ondansetron (ZOFRAN ODT) dispertab 4 mg  4  mg Q4HRS PRN Dione Hernandez M.D.       • promethazine (PHENERGAN) tablet 12.5-25 mg  12.5-25 mg Q4HRS PRN Dione Hernandez M.D.       • promethazine (PHENERGAN) suppository 12.5-25 mg  12.5-25 mg Q4HRS PRN Dione Hernandez M.D.       • prochlorperazine (COMPAZINE) injection 5-10 mg  5-10 mg Q4HRS PRN Dione Hernandez M.D.       • guaifenesin DM (ROBITUSSIN DM) 100-10 MG/5ML syrup 10 mL  10 mL Q6HRS PRN Dione Hernandez M.D.       • ipratropium-albuterol (DUONEB) nebulizer solution 3 mL  3 mL Q6HRS PRN (RT) Dione Hernandez M.D.         Last reviewed on 7/5/2017  4:58 PM by Terrence Alcocer    Quality  Measures:  Labs reviewed, Medications reviewed and Radiology images reviewed                        Assessment and Plan:    Acute hypoxemic respiratory failure   - resolving  Acute bilateral PE with right heart strain   - S/P alteplase   - cont rivaroxaban   - echocardiogram with decreased RV function   - will give short course of Toradol for pleuritic chest pain  Acute RLE DVT - cont anticoagulation    It seems that the patient DVT/Pe was unprovoked. He probably need long term anticoagulation treatment     Pt can be d/juanita today.   Needs follow up with pulmonary and primary care to determine the length of anticoagulation treatment

## 2017-07-09 NOTE — PROGRESS NOTES
[facility] [place] Internal Medicine Interval Note    Name Thony Crow     1965   Age/Sex 52 y.o. male   MRN [id number]   Code Status full     After 5PM or if no immediate response to page, please call for cross-coverage  Attending/Team: Yuri / Blue Team Use Tiger Text to page  6AM-5PM  Call [phone] to page afterhours   1st Call - Day Intern (R1):   Donna 2nd Call - Day Sr. Resident (R2/R3):   Jessica         Chief complaint/ reason for interval visit (Primary Diagnosis)   Bilateral PE, right LE DVT, and cardiac demand-ischemia (NSTEMI)     Interval Problem Daily Status Update    Patient notes that he is breathing better than yesterday, and appears comfortable while resting on RA.  He notes SOB/VELA when walking (and found to be desaturating into mid 80's with walking).  He also notes a new headache that responds well to ketorlac.     Review of Systems   Constitutional: Negative for fever and chills.   HENT: Negative for congestion, ear pain and sore throat.    Eyes: Negative for blurred vision and pain.   Respiratory: Positive for shortness of breath. Negative for cough, wheezing and stridor.         Notes SOB/VELA when walking.   Cardiovascular: Negative for chest pain, palpitations, orthopnea and leg swelling.   Gastrointestinal: Negative for heartburn, nausea, vomiting, abdominal pain, diarrhea and constipation.   Genitourinary: Negative for dysuria.   Musculoskeletal: Positive for joint pain. Negative for myalgias.        Notes hx of intermittent pain in right knee (s/p ACL repair).   Skin: Negative for rash.   Neurological: Positive for dizziness and headaches. Negative for tingling, tremors, sensory change, speech change, focal weakness, seizures, loss of consciousness and weakness.        Notes dizziness when walking.    Endo/Heme/Allergies: Does not bruise/bleed easily.   Psychiatric/Behavioral: Negative for memory loss. The patient is not nervous/anxious.         Consultants/Specialty  Pulmonology (critical care):  Dr. Sharp (sp?)    Disposition  In-patient for PE, monitor for desaturation.    Quality Measures  EKG reviewed  Sanchez catheter: No Sanchez      DVT: Xarelto for PE + DVT.                  Physical Exam       Filed Vitals:    07/08/17 0500 07/08/17 0645 07/08/17 1500 07/08/17 1831   BP:  121/76 121/83 120/62   Pulse:  67 71 77   Temp:  36.9 °C (98.4 °F) 36.9 °C (98.4 °F) 36.7 °C (98.1 °F)   Resp:  18 17 18   Height:       Weight: 134.6 kg (296 lb 11.8 oz)      SpO2:  92% 91% 94%     Body mass index is 33.42 kg/(m^2). Weight: (!) 134.6 kg (296 lb 11.8 oz)  Oxygen Therapy:  Pulse Oximetry: 94 %, O2 (LPM): 0, O2 Delivery: None (Room Air)    Physical Exam   Constitutional: He is oriented to person, place, and time and well-developed, well-nourished, and in no distress.   HENT:   Head: Normocephalic and atraumatic.   Eyes: Conjunctivae and EOM are normal.   Neck: No JVD present. No tracheal deviation present.   Cardiovascular: Normal rate and regular rhythm.  Exam reveals no gallop and no friction rub.    No murmur heard.  Pulmonary/Chest: Effort normal. No stridor. No respiratory distress. He has no wheezes. He exhibits no tenderness.   Slight crackles, bibasilar.   Abdominal: Soft. Bowel sounds are normal. He exhibits no distension. There is no tenderness. There is no rebound and no guarding.   Musculoskeletal: Normal range of motion. He exhibits no edema or tenderness.   Neurological: He is alert and oriented to person, place, and time.   Skin: Skin is warm and dry.   Psychiatric: Mood and affect normal.         Lab Data Review:      7/8/2017  6:47 PM    Recent Labs      07/06/17   0235  07/07/17   0410  07/08/17   0316   SODIUM  141  138  138   POTASSIUM  3.9  3.9  4.4   CHLORIDE  113*  108  107   CO2  21  22  28   BUN  14  11  12   CREATININE  0.97  0.93  1.01   MAGNESIUM  2.0  1.9  2.1   CALCIUM  7.5*  7.8*  8.3*       Recent Labs      07/06/17   0236   07/07/17   0410  07/08/17   0316   ALTSGPT  24  21   --    ASTSGOT  21  15   --    ALKPHOSPHAT  63  66   --    TBILIRUBIN  0.6  0.5   --    GLUCOSE  95  89  124*       Recent Labs      07/06/17   0235  07/06/17   0850  07/06/17   1442  07/07/17   0410  07/08/17   0316   RBC  3.50*   --    --   3.48*  3.80*   HEMOGLOBIN  10.6*   --    --   10.5*  11.4*   HEMATOCRIT  32.3*   --    --   32.3*  34.7*   PLATELETCT  133*   --    --   149*  202   APTT  39.5*  37.7*  34.5  29.1   --        Recent Labs      07/06/17   0235  07/07/17 0410  07/08/17 0316   WBC  7.7  6.1  6.4   NEUTSPOLYS  56.60  59.50  63.40   LYMPHOCYTES  28.50  24.90  21.40*   MONOCYTES  8.90  7.60  6.60   EOSINOPHILS  4.60  6.60  7.20*   BASOPHILS  0.70  0.70  0.60   ASTSGOT  21  15   --    ALTSGPT  24  21   --    ALKPHOSPHAT  63  66   --    TBILIRUBIN  0.6  0.5   --            Assessment/Plan     * Pulmonary embolism (CMS-HCC) (present on admission)  Assessment & Plan  pt presented with sudden onset chest pain to outside facility;   EKG showed  S1Q3T3 changes.    CTPE shows extensive acute b/l PEs with e/o RV strain;   tPA administered in ER (full dose);  Then watched in ICU.  LE duplex demonstrated DVT in right leg,      and he noted that the pain and swelling began from knee down (rather than limited to right knee).  Was on heparin post-tPA; then switched to Xarelto.   Patient still notes light-headedness and desaturation while walking. (and mild headache).  Keep overnight and monitor.  Will likely need lifelong anticoagulaiton, since this appears unprovoked DVT / PE.    Non-ST elevation myocardial infarction (NSTEMI) due to mismatch of myocardial oxygen supply and demand (CMS-HCC) (present on admission)  Assessment & Plan  Had +troponins that trended downwards.    EKG shows NSR with S1Q3T3  Echo - EF~50%, mod.dilated.R.ventricle, mild R&L .vent.systolic.dysfunciton, and mild MR  Likely 2/2 demand ischemia from PE     (on Xarelto, for  PE/DVT).    Acute hypoxemic respiratory failure (CMS-HCC) (present on admission)  Assessment & Plan  Appears 2/2 PE   Improved after tPA.  On RT protocol; Q6H duonebs  O2 requirements decreased from 4L, to RA.   Still desaturates while walking.  Continue to monitor overnight.         MARJAN (acute kidney injury) - resolved (present on admission)  Assessment & Plan  Creat reported to have been 1.39 in [place]       (? was an abrupt increase ?)  likely prerenal; was hydrated  Cr now stable (aorund 1)    Hypokalemia - resolved (present on admission)  Assessment & Plan  Was mild decrease (3.8 in [place])  Received supplemental K+, and resolved.

## 2017-07-10 NOTE — DISCHARGE SUMMARY
"        Internal Medicine Discharge Summary      Admit Date:  7/5/2017       Discharge Date:   07/09/2017    Service:   [facility] Internal Medicine Blue Team  Attending Physician(s):   Dr Welch    Senior Resident(s):   Dr Lopez  [place] Resident(s):   Dr Thompson      Primary Diagnosis:   Acute Hypoxemic Respiratory Failure 2/2 to Bilateral PE  Acute Right lower extremity DVT        Secondary Diagnoses:                Principal Problem:    Pulmonary embolism (CMS-HCC) POA: Yes  Active Problems:    Non-ST elevation myocardial infarction (NSTEMI) due to mismatch of myocardial oxygen supply and demand (CMS-[facility]) POA: Yes    Acute hypoxemic respiratory failure (CMS-HCC) POA: Yes    MARJAN (acute kidney injury) - resolved POA: Yes    Hypokalemia - resolved POA: Yes    Knee pain - right side POA: Yes      Overview: Patient additionally had \"twisted\" his right knee a few times          (one of which was around the time the leg pain began)         with a history of past ACL surgery on the right.       However, he notes the leg pain and swelling began throughout the lower leg         (rather than limited to the knee region).   Resolved Problems:    * No resolved hospital problems. *      Hospital Summary (Brief Narrative):        53 y/o male with no prior PMHx who presented to [facility] with complaints of sudden onset chest pain  He has a h/o remote R ACL repair and then re-tearing it 20 years ago, and last week he injured the same knee while lifting something heavy and was immobilized due to pain.  The next day he noted swelling of his entire RLE from thigh to foot, and continued to be bedridden due to this. Subsequently he developed sudden onset of diaphoresis and substernal chest pressure with extreme dyspnea and dizziness . He was taken to [facility] where his trop was noted to be 0.9 and he was hypoxic requiring 15 and then later 8-10 L/min O2.  He was given  mg and 2 nitro tabs, but then his BP " dropped, so he was transferred here.  Both outside EKG and EKG here showed S1Q3T3 pattern on EKG, and CTPE here showed bilateral diffuse PE.  He was given full dose tPA in ER and got admitted to ICU. He received 100 mg IV alteplase followed by heparin drip and kept on post fibrinolytic protocol for his acute bilateral PE with right heart strain. His echocardiogram showed Left ventricular ejection fraction of 50%,   moderately dilated right ventricle,reduced right ventricular systolic function.U/S of LE showed, right lower DVT with subacute thrombosis of the distal external iliac, proximal common femoral and proximal greater saphenous veins and no evidence of deep vein thrombosis or superficial phlebitis.Repeat troponin were found to be elevated at 2.9; EKG showed no new acute ischemic change attributed most likely to right heart strain given bilateral PE.In ICU he was kept on Heparin drip and transition to Xarelto and improved clinically requiring 4L 02. He was transferred onto the medical floor for further observation, his left leg swelling decreased, with improve oxygenation and no further complaints of shortness of breath/chest pain/dizziness and/or fever/diaphoresis and stayed hemodynamically stable.  He was evaluated for home oxygen needs but on discharge was ambulating without desaturating. He is further advised to take Xarelto 15 mg BID until 07/27 followed by Xarelto 20 mg q daily and follow up with PCP within next 1-2 weeks. Per Pulmonology, highly suspecting unprovoked DVT/PE and recommended long term/lifelong anticoagulation.        Patient /Hospital Summary (Details -- Problem Oriented) :          Non-ST elevation myocardial infarction (NSTEMI) due to mismatch of myocardial oxygen supply and demand (CMS-[facility])  Assessment & Plan  Had +troponins that trended downwards.    EKG shows NSR with S1Q3T3  Echo - EF~50%, mod.dilated.R.ventricle, mild R&L .vent.systolic.dysfunciton, and mild MR  Likely 2/2 demand  ischemia from PE     (on Xarelto, for PE/DVT).    Acute hypoxemic respiratory failure (CMS-HCC)  Assessment & Plan  Appears 2/2 PE   Improved after tPA.  On RT protocol; Q6H duonebs  O2 requirements decreased from 4L, to RA.   Still desaturates while walking.  Continue to monitor overnight.         * Pulmonary embolism (CMS-HCC)  Assessment & Plan  pt presented with sudden onset chest pain to outside facility;   EKG showed  S1Q3T3 changes.    CTPE shows extensive acute b/l PEs with e/o RV strain;   tPA administered in ER (full dose);  Then watched in ICU.  LE duplex demonstrated DVT in right leg,      and he noted that the pain and swelling began from knee down (rather than limited to right knee).  Was on heparin post-tPA; then switched to Xarelto.   Patient still notes light-headedness and desaturation while walking. (and mild headache).  Keep overnight and monitor.  Will likely need lifelong anticoagulaiton, since this appears unprovoked DVT / PE.    MARJAN (acute kidney injury) - resolved  Assessment & Plan  Creat reported to have been 1.39 in [place]       (? was an abrupt increase ?)  likely prerenal; was hydrated  Cr now stable (aorund 1)    Hypokalemia - resolved  Assessment & Plan  Was mild decrease (3.8 in [place])  Received supplemental K+, and resolved.       Consultants:     Pulmonology  PMA-ICU  Procedures:        S/p TPA      Imaging/ Testing:      ECHOCARDIOGRAM-COMP W/ CONT   Final Result      LE Venous Duplex-DVT ([facility] only)   Preliminary Result      DX-KNEE COMPLETE 4+ RIGHT   Final Result      1.  No evidence of acute fracture or dislocation.      2.  Prior anterior cruciate ligament graft repair.      3.  Tricompartment degenerative change.      4.  Multiple intra-articular ossific loose bodies.      CT-CTA CHEST PULMONARY ARTERY W/ RECONS   Final Result   Addendum 1 of 1   These findings were discussed with CIELO OLIVARES on 7/5/2017 4:41 PM.      Final      1.  RIGHT  ventricular strain extensive acute bilateral pulmonary emboli with findings raising concern for RIGHT ventricular strain.   2.  No pneumonia or pneumothorax.               OUTSIDE IMAGES-DX CHEST   Preliminary Result          Discharge Medications:         Medication Reconciliation: Completed       Medication List      START taking these medications       Instructions    * rivaroxaban 15 MG Tabs tablet   Last time this was given:  15 mg on 7/9/2017  8:46 AM   Commonly known as:  XARELTO    Take 1 Tab by mouth 2 Times a Day for 18 days.   Dose:  15 mg       * rivaroxaban 20 MG Tabs tablet   Start taking on:  7/28/2017   Last time this was given:  15 mg on 7/9/2017  8:46 AM   Commonly known as:  XARELTO    Take 1 Tab by mouth every day.   Dose:  20 mg       * Notice:  This list has 2 medication(s) that are the same as other medications prescribed for you. Read the directions carefully, and ask your doctor or other care provider to review them with you.                Disposition:   At home    Diet:   Regular     Activity:   As tolerated    Instructions:      Please follow up with PCP within 1-2 weeks.  The patient was instructed to return to the ER in the event of worsening symptoms. I have counseled the patient on the importance of compliance and the patient has agreed to proceed with all medical recommendations and follow up plan indicated above.   The patient understands that all medications come with benefits and risks. Risks may include permanent injury or death and these risks can be minimized with close reassessment and monitoring.        Primary Care Provider:   Pcp Not In Computer    Discharge summary faxed to primary care provider:  Completed  Copy of discharge summary given to the patient: Completed      Follow up appointment details :      Patient given number to call and will call to make appointment with Dr Lopez/[facility] clinic.    Pending Studies:        None    Time spent on discharge day patient visit,  preparing discharge paperwork and arranging for patient follow up.    Summary of follow up issues:       Discharge Time (Minutes) :    60 mins.        Condition on Discharge    ______________________________________________________________________    Interval history/exam for day of discharge:    Feeling a lot better,ambulating without desaturations, denies any lightheadedness/dizziness, shortness of breath and/or pleuritic chest pain.    Filed Vitals:    07/08/17 1500 07/08/17 1831 07/09/17 0400 07/09/17 0750   BP: 121/83 120/62 114/62 132/82   Pulse: 71 77 69 83   Temp: 36.9 °C (98.4 °F) 36.7 °C (98.1 °F) 36.6 °C (97.8 °F) 36.2 °C (97.2 °F)   Resp: 17 18 18 18   Height:       Weight:       SpO2: 91% 94% 93% 94%     Weight/BMI: Body mass index is 33.42 kg/(m^2).  Pulse Oximetry: 94 %, O2 (LPM): 0, O2 Delivery: None (Room Air)    General: Sitting comfortably without any acute distress  CVS: S1, S2 normal with no rales/murmurs/gallops  PULM: CTAB with no adventitous sounds, non labored breathing with no signs of respiratory distress.      Most Recent Labs:    Lab Results   Component Value Date/Time    WBC 7.6 07/09/2017 02:27 AM    RBC 3.96* 07/09/2017 02:27 AM    HEMOGLOBIN 11.8* 07/09/2017 02:27 AM    HEMATOCRIT 35.6* 07/09/2017 02:27 AM    MCV 89.9 07/09/2017 02:27 AM    MCH 29.8 07/09/2017 02:27 AM    MCHC 33.1* 07/09/2017 02:27 AM    MPV 9.7 07/09/2017 02:27 AM    NEUTROPHILS-POLYS 65.70 07/09/2017 02:27 AM    LYMPHOCYTES 20.30* 07/09/2017 02:27 AM    MONOCYTES 6.20 07/09/2017 02:27 AM    EOSINOPHILS 6.40 07/09/2017 02:27 AM    BASOPHILS 0.70 07/09/2017 02:27 AM      Lab Results   Component Value Date/Time    SODIUM 139 07/09/2017 02:27 AM    POTASSIUM 4.2 07/09/2017 02:27 AM    CHLORIDE 108 07/09/2017 02:27 AM    CO2 24 07/09/2017 02:27 AM    GLUCOSE 98 07/09/2017 02:27 AM    BUN 11 07/09/2017 02:27 AM    CREATININE 0.95 07/09/2017 02:27 AM      Lab Results   Component Value Date/Time    ALT(SGPT) 28 07/09/2017  02:27 AM    AST(SGOT) 16 07/09/2017 02:27 AM    ALKALINE PHOSPHATASE 68 07/09/2017 02:27 AM    TOTAL BILIRUBIN 0.5 07/09/2017 02:27 AM    ALBUMIN 3.1* 07/09/2017 02:27 AM    GLOBULIN 3.1 07/09/2017 02:27 AM    INR 1.12 07/05/2017 03:23 PM     Lab Results   Component Value Date/Time    PT 14.8* 07/05/2017 03:23 PM    INR 1.12 07/05/2017 03:23 PM

## 2017-07-11 ENCOUNTER — APPOINTMENT (OUTPATIENT)
Dept: RADIOLOGY | Facility: MEDICAL CENTER | Age: 52
End: 2017-07-11
Attending: EMERGENCY MEDICINE
Payer: COMMERCIAL

## 2017-07-11 ENCOUNTER — HOSPITAL ENCOUNTER (EMERGENCY)
Facility: MEDICAL CENTER | Age: 52
End: 2017-07-11
Attending: EMERGENCY MEDICINE
Payer: COMMERCIAL

## 2017-07-11 ENCOUNTER — HOSPITAL ENCOUNTER (EMERGENCY)
Facility: MEDICAL CENTER | Age: 52
End: 2017-07-11
Payer: COMMERCIAL

## 2017-07-11 VITALS
OXYGEN SATURATION: 96 % | RESPIRATION RATE: 14 BRPM | HEIGHT: 78 IN | WEIGHT: 290.13 LBS | HEART RATE: 85 BPM | TEMPERATURE: 99.3 F | BODY MASS INDEX: 33.57 KG/M2 | SYSTOLIC BLOOD PRESSURE: 154 MMHG | DIASTOLIC BLOOD PRESSURE: 81 MMHG

## 2017-07-11 VITALS
OXYGEN SATURATION: 98 % | RESPIRATION RATE: 16 BRPM | DIASTOLIC BLOOD PRESSURE: 78 MMHG | TEMPERATURE: 98 F | BODY MASS INDEX: 32.67 KG/M2 | WEIGHT: 290.13 LBS | HEART RATE: 75 BPM | SYSTOLIC BLOOD PRESSURE: 117 MMHG

## 2017-07-11 DIAGNOSIS — I26.99 OTHER PULMONARY EMBOLISM WITHOUT ACUTE COR PULMONALE, UNSPECIFIED CHRONICITY (HCC): ICD-10-CM

## 2017-07-11 DIAGNOSIS — J32.4 PANSINUSITIS, UNSPECIFIED CHRONICITY: ICD-10-CM

## 2017-07-11 PROCEDURE — 302449 STATCHG TRIAGE ONLY (STATISTIC)

## 2017-07-11 PROCEDURE — 99284 EMERGENCY DEPT VISIT MOD MDM: CPT

## 2017-07-11 PROCEDURE — 700102 HCHG RX REV CODE 250 W/ 637 OVERRIDE(OP): Performed by: EMERGENCY MEDICINE

## 2017-07-11 PROCEDURE — A9270 NON-COVERED ITEM OR SERVICE: HCPCS | Performed by: EMERGENCY MEDICINE

## 2017-07-11 PROCEDURE — 70450 CT HEAD/BRAIN W/O DYE: CPT

## 2017-07-11 RX ORDER — AMOXICILLIN AND CLAVULANATE POTASSIUM 875; 125 MG/1; MG/1
1 TABLET, FILM COATED ORAL ONCE
Status: COMPLETED | OUTPATIENT
Start: 2017-07-11 | End: 2017-07-11

## 2017-07-11 RX ORDER — AMOXICILLIN AND CLAVULANATE POTASSIUM 875; 125 MG/1; MG/1
1 TABLET, FILM COATED ORAL 2 TIMES DAILY
Qty: 20 TAB | Refills: 0 | Status: SHIPPED | OUTPATIENT
Start: 2017-07-11 | End: 2017-07-21

## 2017-07-11 RX ORDER — FLUTICASONE PROPIONATE 50 MCG
1 SPRAY, SUSPENSION (ML) NASAL DAILY
Qty: 1 BOTTLE | Refills: 0 | Status: SHIPPED | OUTPATIENT
Start: 2017-07-11 | End: 2019-05-29

## 2017-07-11 RX ADMIN — RIVAROXABAN 15 MG: 15 TABLET, FILM COATED ORAL at 16:31

## 2017-07-11 RX ADMIN — AMOXICILLIN AND CLAVULANATE POTASSIUM 1 TABLET: 875; 125 TABLET, FILM COATED ORAL at 16:30

## 2017-07-11 ASSESSMENT — PAIN SCALES - GENERAL: PAINLEVEL_OUTOF10: 3

## 2017-07-11 NOTE — DISCHARGE INSTRUCTIONS

## 2017-07-11 NOTE — ED NOTES
"Thony Crow  Chief Complaint   Patient presents with   • Other     pt discharged from hospital Sunday with blood clot, unable to get xarelto filled.     • Headache     frontal   • Groin Pain     (R) sided- reoccuring since admission   • Anxiety     \"because I can't get medication filled\"     Pt ambulatory to triage with above complaint.  VSS.  Pt was in BR when called to room previously.   Pt returned to lobby, educated on triage process, and to inform staff of any changes or concerns.     "

## 2017-07-11 NOTE — ED NOTES
"Thony Crow  Chief Complaint   Patient presents with   • Groin Pain     (R) sided, re occuring   • Headache     frontal   • Other     pt was discharge from here 2 days ago, after being admitted on 7/5 for blood clot.  unable to get xarelto filled since d/c and symptoms reoccuring.    • Anxiety     because \"I can't get prescriptions filled\".       Pt ambulatory to triage with above complaint.  VSS.  Pt returned to lobby, educated on triage process, and to inform staff of any changes or concerns.     "

## 2017-07-11 NOTE — ED PROVIDER NOTES
ED Provider Note    CHIEF COMPLAINT  Headache  I need my medications    HPI  Thony Crow is a 52 y.o. male who presents to the emergency department with several complaints. Patient was hospitalized recently with pulmonary emboli with large clot burden requiring thrombolytic therapy. He was discharged on 7/9 with xarelto. His last dose of xarelto was that day. He was given a prescription for Zovirax so and told to fill the prescription at the pharmacy in the hospital. He could not find the pharmacy and therefore did not fill this medication. He does have some moderate pain in his right groin similar to his previous DVT. He states that since he was hospitalized he's had a frontal headache described as pressure. Constant. No focal weakness numbness neurologic symptoms. He does feel some nasal congestion. He hasn't had fever or neck stiffness. At this point he is anxious because he cannot get his medications filled.    REVIEW OF SYSTEMS  As per HPI, otherwise a 10 point review of systems is negative    PAST MEDICAL HISTORY  Pulmonary embolism    SOCIAL HISTORY  Social History   Substance Use Topics   • Smoking status: Current Some Day Smoker     Types: Cigars   • Smokeless tobacco: Not on file   • Alcohol Use: No      Comment: rarely       SURGICAL HISTORY  Past Surgical History   Procedure Laterality Date   • Other orthopedic surgery  1991     ACL repair R knee    • Other orthopedic surgery  2010     meniscus repair L knee       CURRENT MEDICATIONS  Home Medications     **Home medications have not yet been reviewed for this encounter**          ALLERGIES  No Known Allergies    PHYSICAL EXAM  VITAL SIGNS: /78 mmHg  Pulse 75  Temp(Src) 36.7 °C (98 °F) (Temporal)  Resp 16  Wt 131.6 kg (290 lb 2 oz)  SpO2 98%   Constitutional: Awake and alert  HENT:  Atraumatic, Normocephalic.Oropharynx moist mucus membranes, nasal mucosal edema. No purulent discharge  Eyes: Normal inspection  Neck: Supple  Cardiovascular:  Normal heart rate, Normal rhythm.  Symmetric peripheral pulses.   Thorax & Lungs: No respiratory distress, No wheezing, No rales, No rhonchi, No chest tenderness.   Abdomen: Bowel sounds normal, soft, non-distended, nontender, no mass  Extremities: No remarkable asymmetric swelling. He does have a few scattered areas of ecchymosis over his extremities  Neurologic: Grossly normal   Psychiatric: Anxious appearing    RADIOLOGY/PROCEDURES  CT-HEAD W/O   Final Result      1.  No acute intracranial abnormality.   2.  Pansinusitis, likely chronic.         Imaging is interpreted by radiologist      COURSE & MEDICAL DECISION MAKING  Patient presents to ER with several complaints. He complained of a headache. He was recently given thrombolytics. A CT scan of the head was obtained and shows pansinusitis. He does not have intracranial hemorrhage. He does not have any other red flags. Sinusitis will be treated with Augmentin. Given 1st dose in the ER. Given a prescription for Flonase as well. At this point his vital signs are stable. He does not of any shortness of breath or pleuritic pain. He does not have any acute symptoms with regards to venous thromboembolism to require additional workup, but certainly does require continuation of his anticoagulants.He is given his dose of xarelto 15 mg orally. I contacted the  to ensure that the patient can get his medications. He was given a free 30 day trial. He will start this medication tomorrow and take it twice daily. He understood the importance of compliance with his treatment.       FINAL IMPRESSION  1. Venous thromboembolism, subacute  2. Pansinusitis      This dictation was created using voice recognition software. The accuracy of the dictation is limited to the abilities of the software.  The nursing notes were reviewed and certain aspects of this information were incorporated into this note.      Electronically signed by: Emanuel King, 7/11/2017 5:36 PM

## 2017-07-11 NOTE — ED AVS SNAPSHOT
Home Care Instructions                                                                                                                Thony Crow   MRN: [id number]    Department:  [facility], Emergency Dept   Date of Visit:  7/11/2017            [facility], Emergency Dept    [address]    [place] [address]    Phone:  [phone]      You were seen by     Emanuel King M.D.      Your Diagnosis Was     Pansinusitis, unspecified chronicity     J32.4       These are the medications you received during your hospitalization from 07/11/2017 1422 to 07/11/2017 1719     Date/Time Order Dose Route Action    07/11/2017 1631 rivaroxaban (XARELTO) tablet 15 mg 15 mg Oral Given    07/11/2017 1630 amoxicillin-clavulanate (AUGMENTIN) 875-125 MG per tablet 1 Tab 1 Tab Oral Given      Follow-up Information     1. Please follow up.    Why:  See your doctor this week      Medication Information     Review all of your home medications and newly ordered medications with your primary doctor and/or pharmacist as soon as possible. Follow medication instructions as directed by your doctor and/or pharmacist.     Please keep your complete medication list with you and share with your physician. Update the information when medications are discontinued, doses are changed, or new medications (including over-the-counter products) are added; and carry medication information at all times in the event of emergency situations.               Medication List      START taking these medications        Instructions    Morning Afternoon Evening Bedtime    amoxicillin-clavulanate 875-125 MG Tabs   Last time this was given:  1 Tab on 7/11/2017  4:30 PM   Commonly known as:  AUGMENTIN        Take 1 Tab by mouth 2 times a day for 10 days.   Dose:  1 Tab                        fluticasone 50 MCG/ACT nasal spray   Commonly known as:  FLONASE        Spray 1 Spray in nose every day.   Dose:  1 Spray                             ASK your doctor about these medications        Instructions    Morning Afternoon Evening Bedtime    * rivaroxaban 15 MG Tabs tablet   Last time this was given:  15 mg on 7/11/2017  4:31 PM   Commonly known as:  XARELTO        Take 1 Tab by mouth 2 Times a Day for 18 days.   Dose:  15 mg                        * rivaroxaban 20 MG Tabs tablet   Start taking on:  7/28/2017   Last time this was given:  15 mg on 7/11/2017  4:31 PM   Commonly known as:  XARELTO        Take 1 Tab by mouth every day.   Dose:  20 mg                        * Notice:  This list has 2 medication(s) that are the same as other medications prescribed for you. Read the directions carefully, and ask your doctor or other care provider to review them with you.         Where to Get Your Medications      You can get these medications from any pharmacy     Bring a paper prescription for each of these medications    - amoxicillin-clavulanate 875-125 MG Tabs  - fluticasone 50 MCG/ACT nasal spray            Procedures and tests performed during your visit     CT-HEAD W/O        Discharge Instructions       Sinusitis, Adult  Sinusitis is redness, soreness, and inflammation of the paranasal sinuses. Paranasal sinuses are air pockets within the bones of your face. They are located beneath your eyes, in the middle of your forehead, and above your eyes. In healthy paranasal sinuses, mucus is able to drain out, and air is able to circulate through them by way of your nose. However, when your paranasal sinuses are inflamed, mucus and air can become trapped. This can allow bacteria and other germs to grow and cause infection.  Sinusitis can develop quickly and last only a short time (acute) or continue over a long period (chronic). Sinusitis that lasts for more than 12 weeks is considered chronic.  CAUSES  Causes of sinusitis include:  · Allergies.  · Structural abnormalities, such as displacement of the cartilage that separates your nostrils (deviated  septum), which can decrease the air flow through your nose and sinuses and affect sinus drainage.  · Functional abnormalities, such as when the small hairs (cilia) that line your sinuses and help remove mucus do not work properly or are not present.  SIGNS AND SYMPTOMS  Symptoms of acute and chronic sinusitis are the same. The primary symptoms are pain and pressure around the affected sinuses. Other symptoms include:  · Upper toothache.  · Earache.  · Headache.  · Bad breath.  · Decreased sense of smell and taste.  · A cough, which worsens when you are lying flat.  · Fatigue.  · Fever.  · Thick drainage from your nose, which often is green and may contain pus (purulent).  · Swelling and warmth over the affected sinuses.  DIAGNOSIS  Your health care provider will perform a physical exam. During your exam, your health care provider may perform any of the following to help determine if you have acute sinusitis or chronic sinusitis:  · Look in your nose for signs of abnormal growths in your nostrils (nasal polyps).  · Tap over the affected sinus to check for signs of infection.  · View the inside of your sinuses using an imaging device that has a light attached (endoscope).  If your health care provider suspects that you have chronic sinusitis, one or more of the following tests may be recommended:  · Allergy tests.  · Nasal culture. A sample of mucus is taken from your nose, sent to a lab, and screened for bacteria.  · Nasal cytology. A sample of mucus is taken from your nose and examined by your health care provider to determine if your sinusitis is related to an allergy.  TREATMENT  Most cases of acute sinusitis are related to a viral infection and will resolve on their own within 10 days. Sometimes, medicines are prescribed to help relieve symptoms of both acute and chronic sinusitis. These may include pain medicines, decongestants, nasal steroid sprays, or saline sprays.  However, for sinusitis related to a  bacterial infection, your health care provider will prescribe antibiotic medicines. These are medicines that will help kill the bacteria causing the infection.  Rarely, sinusitis is caused by a fungal infection. In these cases, your health care provider will prescribe antifungal medicine.  For some cases of chronic sinusitis, surgery is needed. Generally, these are cases in which sinusitis recurs more than 3 times per year, despite other treatments.  HOME CARE INSTRUCTIONS  · Drink plenty of water. Water helps thin the mucus so your sinuses can drain more easily.  · Use a humidifier.  · Inhale steam 3-4 times a day (for example, sit in the bathroom with the shower running).  · Apply a warm, moist washcloth to your face 3-4 times a day, or as directed by your health care provider.  · Use saline nasal sprays to help moisten and clean your sinuses.  · Take medicines only as directed by your health care provider.  · If you were prescribed either an antibiotic or antifungal medicine, finish it all even if you start to feel better.  SEEK IMMEDIATE MEDICAL CARE IF:  · You have increasing pain or severe headaches.  · You have nausea, vomiting, or drowsiness.  · You have swelling around your face.  · You have vision problems.  · You have a stiff neck.  · You have difficulty breathing.     This information is not intended to replace advice given to you by your health care provider. Make sure you discuss any questions you have with your health care provider.     Document Released: 12/18/2006 Document Revised: 01/08/2016 Document Reviewed: 01/01/2013  Elsevier Interactive Patient Education ©2016 Elsevier Inc.            Patient Information     Patient Information    Following emergency treatment: all patient requiring follow-up care must return either to a private physician or a clinic if your condition worsens before you are able to obtain further medical attention, please return to the emergency room.     Billing  Information    At [facility], we work to make the billing process streamlined for our patients.  Our Representatives are here to answer any questions you may have regarding your hospital bill.  If you have insurance coverage and have supplied your insurance information to us, we will submit a claim to your insurer on your behalf.  Should you have any questions regarding your bill, we can be reached online or by phone as follows:  Online: You are able pay your bills online or live chat with our representatives about any billing questions you may have. We are here to help Monday - Friday from 8:00am to 7:30pm and 9:00am - 12:00pm on Saturdays.  Please visit [email or web address]  for more information.   Phone:  [phone] or [phone]    Please note that your emergency physician, surgeon, pathologist, radiologist, anesthesiologist, and other specialists are not employed by [facility] and will therefore bill separately for their services.  Please contact them directly for any questions concerning their bills at the numbers below:     Emergency Physician Services:  [phone]  [facility]:  [phone]  [facility]:  [phone]  [facility]:  [phone]    1. Your final bill may vary from the amount quoted upon discharge if all procedures are not complete at that time, or if your doctor has additional procedures of which we are not aware. You will receive an additional bill if you return to the Emergency Department at [facility] for suture removal regardless of the facility of which the sutures were placed.     2. Please arrange for settlement of this account at the emergency registration.    3. All self-pay accounts are due in full at the time of treatment.  If you are unable to meet this obligation then payment is expected within 4-5 days.     4. If you have had radiology studies (CT, X-ray, Ultrasound, MRI), you have  received a preliminary result during your emergency department visit. Please contact the radiology department [phone] to receive a copy of your final result. Please discuss the Final result with your primary physician or with the follow up physician provided.     Crisis Hotline:  [place] Crisis Hotline:  5-034-HLKLIOE or 1-664.577.5614  [place] Crisis Hotline:    1-694.784.9258 or 121-909-0318         ED Discharge Follow Up Questions    1. In order to provide you with very good care, we would like to follow up with a phone call in the next few days.  May we have your permission to contact you?     YES /  NO    2. What is the best phone number to call you? (       )_____-__________    3. What is the best time to call you?      Morning  /  Afternoon  /  Evening                   Patient Signature:  ____________________________________________________________    Date:  ____________________________________________________________

## 2017-07-11 NOTE — ED AVS SNAPSHOT
7/11/2017    Thony Crow   [address]  [place] [address]    Dear Thony:    Psychiatric hospital wants to ensure your discharge home is safe and you or your loved ones have had all of your questions answered regarding your care after you leave the hospital.    Below is a list of resources and contact information should you have any questions regarding your hospital stay, follow-up instructions, or active medical symptoms.    Questions or Concerns Regarding… Contact   Medical Questions Related to Your Discharge  (7 days a week, 8am-5pm) Contact a Nurse Care Coordinator   [phone]   Medical Questions Not Related to Your Discharge  (24 hours a day / 7 days a week)  Contact the Nurse Health Line   [phone]    Medications or Discharge Instructions Refer to your discharge packet   or contact your [facility] Primary Care Provider   [phone]   Follow-up Appointment(s) Schedule your appointment via Fariqak   or contact Scheduling [phone]   Billing Review your statement via Fariqak  or contact Billing [phone]   Medical Records Review your records via Fariqak   or contact Medical Records [phone]     You may receive a telephone call within two days of discharge. This call is to make certain you understand your discharge instructions and have the opportunity to have any questions answered. You can also easily access your medical information, test results and upcoming appointments via the Fariqak free online health management tool. You can learn more and sign up at [email or web address]/Fariqak. For assistance setting up your Fariqak account, please call [phone].    Once again, we want to ensure your discharge home is safe and that you have a clear understanding of any next steps in your care. If you have any questions or concerns, please do not hesitate to contact us, we are here for you. Thank you for choosing [facility] for your healthcare needs.    Sincerely,    Your [facility] Healthcare Team

## 2017-07-11 NOTE — ED AVS SNAPSHOT
Aphios Access Code: [email or web address]  Expires: 8/8/2017  2:31 PM    Your email address is not on file at [facility].  Email Addresses are required for you to sign up for Aphios, please contact [phone] to verify your personal information and to provide your email address prior to attempting to register for Aphios.    Thony BINA Tristin   [address]  [place] [address]    Aphios  A secure, online tool to manage your health information     [facility]’s Aphios® is a secure, online tool that connects you to your personalized health information from the privacy of your home -- day or night - making it very easy for you to manage your healthcare. Once the activation process is completed, you can even access your medical information using the Aphios charity, which is available for free in the Apple Charity store or Google Play store.     To learn more about Aphios, visit [email or web address]    There are two levels of access available (as shown below):   My Chart Features  [facility] Primary Care Doctor [facility]  Specialists [facility]  Urgent  Care Non-[facility] Primary Care Doctor   Email your healthcare team securely and privately 24/7 X X X    Manage appointments: schedule your next appointment; view details of past/upcoming appointments X      Request prescription refills. X      View recent personal medical records, including lab and immunizations X X X X   View health record, including health history, allergies, medications X X X X   Read reports about your outpatient visits, procedures, consult and ER notes X X X X   See your discharge summary, which is a recap of your hospital and/or ER visit that includes your diagnosis, lab results, and care plan X X  X     How to register for Aphios:  Once your e-mail address has been verified, follow the following steps to sign up for Aphios.     1. Go to  [email or web address]  2. Click on the Sign Up Now box, which takes you to the New Member Sign Up page. You  will need to provide the following information:  a. Enter your Vine Access Code exactly as it appears at the top of this page. (You will not need to use this code after you’ve completed the sign-up process. If you do not sign up before the expiration date, you must request a new code.)   b. Enter your date of birth.   c. Enter your home email address.   d. Click Submit, and follow the next screen’s instructions.  3. Create a Twistt ID. This will be your Vine login ID and cannot be changed, so think of one that is secure and easy to remember.  4. Create a Vine password. You can change your password at any time.  5. Enter your Password Reset Question and Answer. This can be used at a later time if you forget your password.   6. Enter your e-mail address. This allows you to receive e-mail notifications when new information is available in Vine.  7. Click Sign Up. You can now view your health information.    For assistance activating your Vine account, call [phone]

## 2017-07-12 NOTE — ED NOTES
Pt given discharge instructions/prescription/ home care instructions, pt verbalized understanding of instructions given, pt ambulatory to [facility].

## 2017-07-12 NOTE — DISCHARGE PLANNING
Medical Social Work    Referral: Help with medications    [facility] received a call requesting assistance with pt prescription for Xarelto. SW provided pt with Xarelto free 30 day trial offer. [facility] also explained to the pt that since he has [facility] he can have is PCP re-write the prescription to get it filled in [place].

## 2017-07-23 LAB — EKG IMPRESSION: NORMAL

## 2019-05-29 ENCOUNTER — HOSPITAL ENCOUNTER (INPATIENT)
Facility: MEDICAL CENTER | Age: 54
LOS: 3 days | DRG: 175 | End: 2019-06-01
Attending: EMERGENCY MEDICINE | Admitting: INTERNAL MEDICINE
Payer: COMMERCIAL

## 2019-05-29 ENCOUNTER — APPOINTMENT (OUTPATIENT)
Dept: RADIOLOGY | Facility: MEDICAL CENTER | Age: 54
DRG: 175 | End: 2019-05-29
Attending: EMERGENCY MEDICINE
Payer: COMMERCIAL

## 2019-05-29 DIAGNOSIS — I82.412 ACUTE DEEP VEIN THROMBOSIS (DVT) OF FEMORAL VEIN OF LEFT LOWER EXTREMITY (HCC): ICD-10-CM

## 2019-05-29 DIAGNOSIS — I26.99 BILATERAL PULMONARY EMBOLISM (HCC): ICD-10-CM

## 2019-05-29 DIAGNOSIS — I26.02 ACUTE SADDLE PULMONARY EMBOLISM WITH ACUTE COR PULMONALE (HCC): Primary | ICD-10-CM

## 2019-05-29 PROBLEM — Z72.0 NICOTINE ABUSE: Status: ACTIVE | Noted: 2019-05-29

## 2019-05-29 PROBLEM — R73.9 HYPERGLYCEMIA: Status: ACTIVE | Noted: 2019-05-29

## 2019-05-29 LAB
ALBUMIN SERPL BCP-MCNC: 4.4 G/DL (ref 3.2–4.9)
ALBUMIN/GLOB SERPL: 1.3 G/DL
ALP SERPL-CCNC: 75 U/L (ref 30–99)
ALT SERPL-CCNC: 17 U/L (ref 2–50)
ANION GAP SERPL CALC-SCNC: 7 MMOL/L (ref 0–11.9)
APTT PPP: 39.9 SEC (ref 24.7–36)
AST SERPL-CCNC: 15 U/L (ref 12–45)
BASOPHILS # BLD AUTO: 0.9 % (ref 0–1.8)
BASOPHILS # BLD: 0.07 K/UL (ref 0–0.12)
BILIRUB SERPL-MCNC: 0.7 MG/DL (ref 0.1–1.5)
BUN SERPL-MCNC: 11 MG/DL (ref 8–22)
CALCIUM SERPL-MCNC: 9.3 MG/DL (ref 8.5–10.5)
CHLORIDE SERPL-SCNC: 105 MMOL/L (ref 96–112)
CO2 SERPL-SCNC: 26 MMOL/L (ref 20–33)
CREAT SERPL-MCNC: 0.94 MG/DL (ref 0.5–1.4)
D DIMER PPP IA.FEU-MCNC: 9.17 UG/ML (FEU) (ref 0–0.5)
EKG IMPRESSION: NORMAL
EOSINOPHIL # BLD AUTO: 0.26 K/UL (ref 0–0.51)
EOSINOPHIL NFR BLD: 3.4 % (ref 0–6.9)
ERYTHROCYTE [DISTWIDTH] IN BLOOD BY AUTOMATED COUNT: 43.8 FL (ref 35.9–50)
GLOBULIN SER CALC-MCNC: 3.5 G/DL (ref 1.9–3.5)
GLUCOSE SERPL-MCNC: 105 MG/DL (ref 65–99)
HCT VFR BLD AUTO: 43.7 % (ref 42–52)
HGB BLD-MCNC: 14.4 G/DL (ref 14–18)
IMM GRANULOCYTES # BLD AUTO: 0.04 K/UL (ref 0–0.11)
IMM GRANULOCYTES NFR BLD AUTO: 0.5 % (ref 0–0.9)
LYMPHOCYTES # BLD AUTO: 1.39 K/UL (ref 1–4.8)
LYMPHOCYTES NFR BLD: 18 % (ref 22–41)
MCH RBC QN AUTO: 30.5 PG (ref 27–33)
MCHC RBC AUTO-ENTMCNC: 33 G/DL (ref 33.7–35.3)
MCV RBC AUTO: 92.6 FL (ref 81.4–97.8)
MONOCYTES # BLD AUTO: 0.73 K/UL (ref 0–0.85)
MONOCYTES NFR BLD AUTO: 9.5 % (ref 0–13.4)
NEUTROPHILS # BLD AUTO: 5.23 K/UL (ref 1.82–7.42)
NEUTROPHILS NFR BLD: 67.7 % (ref 44–72)
NRBC # BLD AUTO: 0 K/UL
NRBC BLD-RTO: 0 /100 WBC
PLATELET # BLD AUTO: 177 K/UL (ref 164–446)
PMV BLD AUTO: 9.3 FL (ref 9–12.9)
POTASSIUM SERPL-SCNC: 3.8 MMOL/L (ref 3.6–5.5)
PROT SERPL-MCNC: 7.9 G/DL (ref 6–8.2)
RBC # BLD AUTO: 4.72 M/UL (ref 4.7–6.1)
SODIUM SERPL-SCNC: 138 MMOL/L (ref 135–145)
WBC # BLD AUTO: 7.7 K/UL (ref 4.8–10.8)

## 2019-05-29 PROCEDURE — 96365 THER/PROPH/DIAG IV INF INIT: CPT

## 2019-05-29 PROCEDURE — 96375 TX/PRO/DX INJ NEW DRUG ADDON: CPT

## 2019-05-29 PROCEDURE — 96366 THER/PROPH/DIAG IV INF ADDON: CPT

## 2019-05-29 PROCEDURE — 71045 X-RAY EXAM CHEST 1 VIEW: CPT

## 2019-05-29 PROCEDURE — 85025 COMPLETE CBC W/AUTO DIFF WBC: CPT

## 2019-05-29 PROCEDURE — 99407 BEHAV CHNG SMOKING > 10 MIN: CPT | Performed by: INTERNAL MEDICINE

## 2019-05-29 PROCEDURE — 85730 THROMBOPLASTIN TIME PARTIAL: CPT

## 2019-05-29 PROCEDURE — 700111 HCHG RX REV CODE 636 W/ 250 OVERRIDE (IP): Performed by: INTERNAL MEDICINE

## 2019-05-29 PROCEDURE — 99223 1ST HOSP IP/OBS HIGH 75: CPT | Mod: 25 | Performed by: INTERNAL MEDICINE

## 2019-05-29 PROCEDURE — A9270 NON-COVERED ITEM OR SERVICE: HCPCS | Performed by: INTERNAL MEDICINE

## 2019-05-29 PROCEDURE — 36415 COLL VENOUS BLD VENIPUNCTURE: CPT

## 2019-05-29 PROCEDURE — 80053 COMPREHEN METABOLIC PANEL: CPT

## 2019-05-29 PROCEDURE — 700111 HCHG RX REV CODE 636 W/ 250 OVERRIDE (IP): Performed by: EMERGENCY MEDICINE

## 2019-05-29 PROCEDURE — 93005 ELECTROCARDIOGRAM TRACING: CPT | Performed by: EMERGENCY MEDICINE

## 2019-05-29 PROCEDURE — 93005 ELECTROCARDIOGRAM TRACING: CPT

## 2019-05-29 PROCEDURE — 93971 EXTREMITY STUDY: CPT | Mod: LT

## 2019-05-29 PROCEDURE — 700117 HCHG RX CONTRAST REV CODE 255: Performed by: EMERGENCY MEDICINE

## 2019-05-29 PROCEDURE — 85379 FIBRIN DEGRADATION QUANT: CPT

## 2019-05-29 PROCEDURE — 700102 HCHG RX REV CODE 250 W/ 637 OVERRIDE(OP): Performed by: INTERNAL MEDICINE

## 2019-05-29 PROCEDURE — 71275 CT ANGIOGRAPHY CHEST: CPT

## 2019-05-29 PROCEDURE — 770020 HCHG ROOM/CARE - TELE (206)

## 2019-05-29 PROCEDURE — 99285 EMERGENCY DEPT VISIT HI MDM: CPT

## 2019-05-29 RX ORDER — ONDANSETRON 4 MG/1
4 TABLET, ORALLY DISINTEGRATING ORAL EVERY 4 HOURS PRN
Status: DISCONTINUED | OUTPATIENT
Start: 2019-05-29 | End: 2019-06-01 | Stop reason: HOSPADM

## 2019-05-29 RX ORDER — ACETAMINOPHEN 325 MG/1
650 TABLET ORAL EVERY 6 HOURS PRN
Status: DISCONTINUED | OUTPATIENT
Start: 2019-05-29 | End: 2019-06-01 | Stop reason: HOSPADM

## 2019-05-29 RX ORDER — HEPARIN SODIUM 1000 [USP'U]/ML
8000 INJECTION, SOLUTION INTRAVENOUS; SUBCUTANEOUS ONCE
Status: COMPLETED | OUTPATIENT
Start: 2019-05-29 | End: 2019-05-29

## 2019-05-29 RX ORDER — PROMETHAZINE HYDROCHLORIDE 25 MG/1
12.5-25 TABLET ORAL EVERY 4 HOURS PRN
Status: DISCONTINUED | OUTPATIENT
Start: 2019-05-29 | End: 2019-06-01 | Stop reason: HOSPADM

## 2019-05-29 RX ORDER — BISACODYL 10 MG
10 SUPPOSITORY, RECTAL RECTAL
Status: DISCONTINUED | OUTPATIENT
Start: 2019-05-29 | End: 2019-06-01 | Stop reason: HOSPADM

## 2019-05-29 RX ORDER — HEPARIN SODIUM 1000 [USP'U]/ML
4400 INJECTION, SOLUTION INTRAVENOUS; SUBCUTANEOUS PRN
Status: DISCONTINUED | OUTPATIENT
Start: 2019-05-29 | End: 2019-05-30

## 2019-05-29 RX ORDER — ONDANSETRON 2 MG/ML
4 INJECTION INTRAMUSCULAR; INTRAVENOUS EVERY 4 HOURS PRN
Status: DISCONTINUED | OUTPATIENT
Start: 2019-05-29 | End: 2019-06-01 | Stop reason: HOSPADM

## 2019-05-29 RX ORDER — AMOXICILLIN 250 MG
2 CAPSULE ORAL 2 TIMES DAILY
Status: DISCONTINUED | OUTPATIENT
Start: 2019-05-29 | End: 2019-06-01 | Stop reason: HOSPADM

## 2019-05-29 RX ORDER — OXYCODONE HYDROCHLORIDE 5 MG/1
5 TABLET ORAL EVERY 6 HOURS PRN
Status: DISCONTINUED | OUTPATIENT
Start: 2019-05-29 | End: 2019-06-01 | Stop reason: HOSPADM

## 2019-05-29 RX ORDER — PROMETHAZINE HYDROCHLORIDE 25 MG/1
12.5-25 SUPPOSITORY RECTAL EVERY 4 HOURS PRN
Status: DISCONTINUED | OUTPATIENT
Start: 2019-05-29 | End: 2019-06-01 | Stop reason: HOSPADM

## 2019-05-29 RX ORDER — POLYETHYLENE GLYCOL 3350 17 G/17G
1 POWDER, FOR SOLUTION ORAL
Status: DISCONTINUED | OUTPATIENT
Start: 2019-05-29 | End: 2019-06-01 | Stop reason: HOSPADM

## 2019-05-29 RX ADMIN — HEPARIN SODIUM 8000 UNITS: 1000 INJECTION, SOLUTION INTRAVENOUS; SUBCUTANEOUS at 14:42

## 2019-05-29 RX ADMIN — ACETAMINOPHEN 650 MG: 325 TABLET, FILM COATED ORAL at 18:03

## 2019-05-29 RX ADMIN — OXYCODONE HYDROCHLORIDE 5 MG: 5 TABLET ORAL at 21:37

## 2019-05-29 RX ADMIN — IOHEXOL 100 ML: 350 INJECTION, SOLUTION INTRAVENOUS at 13:58

## 2019-05-29 RX ADMIN — HEPARIN SODIUM 4400 UNITS: 1000 INJECTION, SOLUTION INTRAVENOUS; SUBCUTANEOUS at 23:03

## 2019-05-29 RX ADMIN — HEPARIN SODIUM 1700 UNITS/HR: 5000 INJECTION, SOLUTION INTRAVENOUS at 14:43

## 2019-05-29 ASSESSMENT — LIFESTYLE VARIABLES
AVERAGE NUMBER OF DAYS PER WEEK YOU HAVE A DRINK CONTAINING ALCOHOL: 2
EVER HAD A DRINK FIRST THING IN THE MORNING TO STEADY YOUR NERVES TO GET RID OF A HANGOVER: NO
TOTAL SCORE: 0
EVER FELT BAD OR GUILTY ABOUT YOUR DRINKING: NO
TOTAL SCORE: 0
HAVE YOU EVER FELT YOU SHOULD CUT DOWN ON YOUR DRINKING: NO
EVER_SMOKED: YES
TOTAL SCORE: 0
HOW MANY TIMES IN THE PAST YEAR HAVE YOU HAD 5 OR MORE DRINKS IN A DAY: 4
ON A TYPICAL DAY WHEN YOU DRINK ALCOHOL HOW MANY DRINKS DO YOU HAVE: 3
CONSUMPTION TOTAL: POSITIVE
HAVE PEOPLE ANNOYED YOU BY CRITICIZING YOUR DRINKING: NO
ALCOHOL_USE: YES

## 2019-05-29 ASSESSMENT — COGNITIVE AND FUNCTIONAL STATUS - GENERAL
SUGGESTED CMS G CODE MODIFIER MOBILITY: CH
SUGGESTED CMS G CODE MODIFIER DAILY ACTIVITY: CH
DAILY ACTIVITIY SCORE: 24
MOBILITY SCORE: 24

## 2019-05-29 ASSESSMENT — ENCOUNTER SYMPTOMS
CHILLS: 0
BLURRED VISION: 0
VOMITING: 0
STRIDOR: 0
SHORTNESS OF BREATH: 1
HEARTBURN: 0
FOCAL WEAKNESS: 0
NECK PAIN: 0
SEIZURES: 0
FEVER: 0
WEIGHT LOSS: 0
PALPITATIONS: 0
SPUTUM PRODUCTION: 0
HEADACHES: 0
NAUSEA: 0
NERVOUS/ANXIOUS: 0
EYE PAIN: 0
DEPRESSION: 0
ORTHOPNEA: 0
MYALGIAS: 0
ABDOMINAL PAIN: 0
EYE DISCHARGE: 0
COUGH: 0
INSOMNIA: 0
DIZZINESS: 0
DIARRHEA: 0
BACK PAIN: 0
EYE REDNESS: 0

## 2019-05-29 ASSESSMENT — COPD QUESTIONNAIRES
COPD SCREENING SCORE: 1
DO YOU EVER COUGH UP ANY MUCUS OR PHLEGM?: NO/ONLY WITH OCCASIONAL COLDS OR INFECTIONS
HAVE YOU SMOKED AT LEAST 100 CIGARETTES IN YOUR ENTIRE LIFE: NO/DON'T KNOW
DURING THE PAST 4 WEEKS HOW MUCH DID YOU FEEL SHORT OF BREATH: NONE/LITTLE OF THE TIME
IN THE PAST 12 MONTHS DO YOU DO LESS THAN YOU USED TO BECAUSE OF YOUR BREATHING PROBLEMS: DISAGREE/UNSURE

## 2019-05-29 ASSESSMENT — PATIENT HEALTH QUESTIONNAIRE - PHQ9
2. FEELING DOWN, DEPRESSED, IRRITABLE, OR HOPELESS: NOT AT ALL
1. LITTLE INTEREST OR PLEASURE IN DOING THINGS: NOT AT ALL
SUM OF ALL RESPONSES TO PHQ9 QUESTIONS 1 AND 2: 0

## 2019-05-29 ASSESSMENT — PAIN SCALES - WONG BAKER: WONGBAKER_NUMERICALRESPONSE: HURTS A LITTLE MORE

## 2019-05-29 NOTE — ED NOTES
Med rec updated and complete. Allergies reviewed. Pt denies oral  Antibiotic use in last 30 days.  Pt takes no medications.

## 2019-05-29 NOTE — H&P
Hospital Medicine History & Physical Note    Date of Service  5/29/2019    Primary Care Physician  Pcp Not In Computer    Consultants  none    Code Status  full    Chief Complaint  SOB    History of Presenting Illness  54 y.o. Male with past medical history of DVT/PE 2 years ago who presented 5/29/2019 with shortness of breath for the past 3 weeks.  He stated that 2 years ago he only took 1 month of anticoagulation and he stopped taking the medication because he stated that his walk is very physical and he does not want side effect related to it.  3 weeks ago he started having intermittent shortness of breath associated with dyspnea on exertion.  It is been getting worse and finally decided to come to come to ER.  In the ER he was found to have bilateral pulmonary embolism on CT scan.  He was started on IV heparin infusion.  He will be admitted for further management.  The patient denies any chest pain, palpitation, dizziness.    Review of Systems  Review of Systems   Constitutional: Negative for chills, fever and weight loss.   HENT: Negative for congestion and nosebleeds.    Eyes: Negative for blurred vision, pain, discharge and redness.   Respiratory: Positive for shortness of breath. Negative for cough, sputum production and stridor.    Cardiovascular: Negative for chest pain, palpitations and orthopnea.   Gastrointestinal: Negative for abdominal pain, diarrhea, heartburn, nausea and vomiting.   Genitourinary: Negative for dysuria, frequency and urgency.   Musculoskeletal: Negative for back pain, myalgias and neck pain.   Skin: Negative for itching and rash.   Neurological: Negative for dizziness, focal weakness, seizures and headaches.   Psychiatric/Behavioral: Negative for depression. The patient is not nervous/anxious and does not have insomnia.        Past Medical History   has a past medical history of Anxiety; Clotting disorder (HCC); Fracture; Heart attack (HCC); and Muscle disorder.    Surgical History    has a past surgical history that includes other orthopedic surgery (1991) and other orthopedic surgery (2010).     Family History  family history includes Diabetes in his father.     Social History   reports that he has been smoking Cigars.  He has never used smokeless tobacco. He reports that he uses drugs, including Inhaled. He reports that he does not drink alcohol.    Allergies  No Known Allergies    Medications  None     Not taking any medications  Physical Exam  Temp:  [36.8 °C (98.2 °F)] 36.8 °C (98.2 °F)  Pulse:  [81-88] 88  Resp:  [18] 18  BP: (116)/(72) 116/72  SpO2:  [95 %] 95 %    Physical Exam   Constitutional: He is oriented to person, place, and time. No distress.   HENT:   Head: Normocephalic and atraumatic.   Mouth/Throat: Oropharynx is clear and moist.   Eyes: Pupils are equal, round, and reactive to light. Conjunctivae and EOM are normal.   Neck: Normal range of motion. Neck supple. No tracheal deviation present. No thyromegaly present.   Cardiovascular: Normal rate and regular rhythm.    No murmur heard.  Pulmonary/Chest: Effort normal and breath sounds normal. No respiratory distress. He has no wheezes.   Abdominal: Soft. Bowel sounds are normal. He exhibits no distension. There is no tenderness.   Musculoskeletal: He exhibits no edema or tenderness.   Neurological: He is alert and oriented to person, place, and time. No cranial nerve deficit.   Skin: Skin is warm and dry. He is not diaphoretic. No erythema.   Psychiatric: He has a normal mood and affect. His behavior is normal. Thought content normal.   Nursing note and vitals reviewed.      Laboratory:  Recent Labs      05/29/19   1157   WBC  7.7   RBC  4.72   HEMOGLOBIN  14.4   HEMATOCRIT  43.7   MCV  92.6   MCH  30.5   MCHC  33.0*   RDW  43.8   PLATELETCT  177   MPV  9.3     Recent Labs      05/29/19   1157   SODIUM  138   POTASSIUM  3.8   CHLORIDE  105   CO2  26   GLUCOSE  105*   BUN  11   CREATININE  0.94   CALCIUM  9.3     Recent Labs       05/29/19   1157   ALTSGPT  17   ASTSGOT  15   ALKPHOSPHAT  75   TBILIRUBIN  0.7   GLUCOSE  105*                 No results for input(s): TROPONINI in the last 72 hours.    Urinalysis:    No results found     Imaging:  US-EXTREMITY VENOUS LOWER UNILAT LEFT   Final Result      CT-CTA CHEST PULMONARY ARTERY W/ RECONS   Final Result      1.  Acute bilateral pulmonary emboli, moderate to severe overall clot burden with subtle embolus present.   2.  Prominent pulmonary arteries suggesting pulmonary hypertension, without gross evidence for RIGHT ventricular strain although LEFT ventricle appears enlarged.   3.  Stable small RIGHT lung nodules measuring up to 5 mm.      These findings were discussed with ZOYA RAJAN on 5/29/2019 2:05 PM.               DX-CHEST-PORTABLE (1 VIEW)   Final Result      No acute cardiopulmonary disease.            Assessment/Plan:  I anticipate this patient will require at least two midnights for appropriate medical management, necessitating inpatient admission.    Pulmonary embolism (HCC)- (present on admission)   Assessment & Plan    Acute bilateral pulmonary embolism  Acute DVT on Left LE  History of DVT PE 2 years ago but did not finish anticoagulation as recommended  I started patient on heparin infusion, I am actively managing heparin rate and monitoring APTT  [place] him on annual anticoagulation  I also order echocardiogram      Nicotine abuse- (present on admission)   Assessment & Plan    Tobacco cessation education provided for more than 11 minutes, discussed options of nicotine patch, medical treatment with wellbutrin and chantix. Discussed the risks of smoking including increased risk of heart disease, stroke, cancer and COPD. Discussed the benefits of quitting smoking. Nicotine replacement protocol will be provided to the patient.     Hyperglycemia- (present on admission)   Assessment & Plan    No history of  diabetes     Acute deep vein thrombosis (DVT) of femoral vein of left lower extremity (HCC)- (present on admission)   Assessment & Plan    Plan as above         VTE prophylaxis: heparin infusion

## 2019-05-29 NOTE — ASSESSMENT & PLAN NOTE
Acute bilateral pulmonary embolism  Acute DVT on Left LE  History of DVT PE 2 years ago but did not finish anticoagulation as recommended  I started patient on heparin infusion, I am actively managing heparin rate and monitoring APTT  [place] the [place] consider discharging him on annual anticoagulation  I also order echocardiogram   5/30: I discontinued heparin drip and started patient on Xarelto 15 mg p.o. twice daily for 21 days then 20 mg daily after that.  Echocardiogram pending.  5/31: Echocardiogram unremarkable. No right heat strain.

## 2019-05-29 NOTE — ED TRIAGE NOTES
"Chief Complaint   Patient presents with   • Shortness of Breath   • Leg Pain       BIB by EMS for above complaint. Patient states that he has a history of a leg DVT 2 years ago. Suppose to be taking Xarelto but has not been taking it \"for a while\". Report that he has been SOB \"on and off for the last 2 weeks\". He states he did injure his left leg at work 2 days ago.      On monitor and in gown. Protocol ordered and IV in placed from EMS.   "

## 2019-05-29 NOTE — ED NOTES
Weight and height verified.  and Pharm updated on change. No change in heparin drip. Patient medicated per MAR.

## 2019-05-30 ENCOUNTER — APPOINTMENT (OUTPATIENT)
Dept: CARDIOLOGY | Facility: MEDICAL CENTER | Age: 54
DRG: 175 | End: 2019-05-30
Attending: INTERNAL MEDICINE
Payer: COMMERCIAL

## 2019-05-30 LAB
ALBUMIN SERPL BCP-MCNC: 4 G/DL (ref 3.2–4.9)
ALBUMIN/GLOB SERPL: 1.4 G/DL
ALP SERPL-CCNC: 64 U/L (ref 30–99)
ALT SERPL-CCNC: 15 U/L (ref 2–50)
ANION GAP SERPL CALC-SCNC: 9 MMOL/L (ref 0–11.9)
APTT PPP: 50.8 SEC (ref 24.7–36)
APTT PPP: 58.7 SEC (ref 24.7–36)
AST SERPL-CCNC: 16 U/L (ref 12–45)
BILIRUB SERPL-MCNC: 0.5 MG/DL (ref 0.1–1.5)
BUN SERPL-MCNC: 15 MG/DL (ref 8–22)
CALCIUM SERPL-MCNC: 8.9 MG/DL (ref 8.5–10.5)
CHLORIDE SERPL-SCNC: 103 MMOL/L (ref 96–112)
CO2 SERPL-SCNC: 25 MMOL/L (ref 20–33)
CREAT SERPL-MCNC: 1.13 MG/DL (ref 0.5–1.4)
ERYTHROCYTE [DISTWIDTH] IN BLOOD BY AUTOMATED COUNT: 44.5 FL (ref 35.9–50)
GLOBULIN SER CALC-MCNC: 2.8 G/DL (ref 1.9–3.5)
GLUCOSE SERPL-MCNC: 127 MG/DL (ref 65–99)
HCT VFR BLD AUTO: 40.7 % (ref 42–52)
HGB BLD-MCNC: 13.1 G/DL (ref 14–18)
LV EJECT FRACT  99904: 50
LV EJECT FRACT MOD 2C 99903: 31.76
LV EJECT FRACT MOD 4C 99902: 55.52
LV EJECT FRACT MOD BP 99901: 43.45
MCH RBC QN AUTO: 29.9 PG (ref 27–33)
MCHC RBC AUTO-ENTMCNC: 32.2 G/DL (ref 33.7–35.3)
MCV RBC AUTO: 92.9 FL (ref 81.4–97.8)
PLATELET # BLD AUTO: 173 K/UL (ref 164–446)
PMV BLD AUTO: 9 FL (ref 9–12.9)
POTASSIUM SERPL-SCNC: 4.2 MMOL/L (ref 3.6–5.5)
PROT SERPL-MCNC: 6.8 G/DL (ref 6–8.2)
RBC # BLD AUTO: 4.38 M/UL (ref 4.7–6.1)
SODIUM SERPL-SCNC: 137 MMOL/L (ref 135–145)
WBC # BLD AUTO: 7.4 K/UL (ref 4.8–10.8)

## 2019-05-30 PROCEDURE — 700102 HCHG RX REV CODE 250 W/ 637 OVERRIDE(OP): Performed by: INTERNAL MEDICINE

## 2019-05-30 PROCEDURE — 99233 SBSQ HOSP IP/OBS HIGH 50: CPT | Performed by: FAMILY MEDICINE

## 2019-05-30 PROCEDURE — 85027 COMPLETE CBC AUTOMATED: CPT

## 2019-05-30 PROCEDURE — 770020 HCHG ROOM/CARE - TELE (206)

## 2019-05-30 PROCEDURE — 700102 HCHG RX REV CODE 250 W/ 637 OVERRIDE(OP): Performed by: FAMILY MEDICINE

## 2019-05-30 PROCEDURE — 700111 HCHG RX REV CODE 636 W/ 250 OVERRIDE (IP): Performed by: INTERNAL MEDICINE

## 2019-05-30 PROCEDURE — A9270 NON-COVERED ITEM OR SERVICE: HCPCS | Performed by: FAMILY MEDICINE

## 2019-05-30 PROCEDURE — 85730 THROMBOPLASTIN TIME PARTIAL: CPT

## 2019-05-30 PROCEDURE — 93306 TTE W/DOPPLER COMPLETE: CPT | Mod: 26 | Performed by: INTERNAL MEDICINE

## 2019-05-30 PROCEDURE — A9270 NON-COVERED ITEM OR SERVICE: HCPCS | Performed by: INTERNAL MEDICINE

## 2019-05-30 PROCEDURE — 80053 COMPREHEN METABOLIC PANEL: CPT

## 2019-05-30 PROCEDURE — 93306 TTE W/DOPPLER COMPLETE: CPT

## 2019-05-30 PROCEDURE — 36415 COLL VENOUS BLD VENIPUNCTURE: CPT

## 2019-05-30 RX ADMIN — RIVAROXABAN 15 MG: 15 TABLET, FILM COATED ORAL at 16:23

## 2019-05-30 RX ADMIN — ACETAMINOPHEN 650 MG: 325 TABLET, FILM COATED ORAL at 23:59

## 2019-05-30 RX ADMIN — HEPARIN SODIUM 2100 UNITS/HR: 5000 INJECTION, SOLUTION INTRAVENOUS at 03:16

## 2019-05-30 RX ADMIN — OXYCODONE HYDROCHLORIDE 5 MG: 5 TABLET ORAL at 03:32

## 2019-05-30 RX ADMIN — HEPARIN SODIUM 4400 UNITS: 1000 INJECTION, SOLUTION INTRAVENOUS; SUBCUTANEOUS at 13:38

## 2019-05-30 RX ADMIN — OXYCODONE HYDROCHLORIDE 5 MG: 5 TABLET ORAL at 23:59

## 2019-05-30 RX ADMIN — OXYCODONE HYDROCHLORIDE 5 MG: 5 TABLET ORAL at 18:34

## 2019-05-30 ASSESSMENT — ENCOUNTER SYMPTOMS
HEADACHES: 0
MYALGIAS: 0
DEPRESSION: 0
VOMITING: 0
PALPITATIONS: 1
DOUBLE VISION: 0
NECK PAIN: 0
DIZZINESS: 0
TINGLING: 0
CHILLS: 0
BACK PAIN: 0
NAUSEA: 0
HEARTBURN: 0
BLURRED VISION: 0
PHOTOPHOBIA: 0
ORTHOPNEA: 0
FEVER: 0
BRUISES/BLEEDS EASILY: 0
COUGH: 0
SHORTNESS OF BREATH: 1

## 2019-05-30 ASSESSMENT — LIFESTYLE VARIABLES: SUBSTANCE_ABUSE: 0

## 2019-05-30 NOTE — CARE PLAN
Problem: Communication  Goal: The ability to communicate needs accurately and effectively will improve  Outcome: PROGRESSING AS EXPECTED  Patient educated to utilize call light. Patient encouraged to ask questions about plan of care. Patient effectively uses call light and is involved in POC.    Problem: Pain Management  Goal: Pain level will decrease to patient's comfort goal  Outcome: PROGRESSING AS EXPECTED  Patient educated to pain scale system. Patient encouraged to verbalize discomfort. Patient taught about non-pharmacological pain management. Patient is comfortable at this time without statements of discomfort or pain.

## 2019-05-30 NOTE — PROGRESS NOTES
Hospital Medicine Daily Progress Note    Date of Service  5/30/2019    Chief Complaint  54 y.o. male admitted 5/29/2019 with shortness of breath    Hospital Course  This is a 54 years old male with past medical history of clotting disorder, history of PE and DVT who comes in with shortness of breath.  Patient has a history of massive PE in 2017 that required alteplase and hospitalization in the intensive care unit.  He was also found to have acute and subacute DVT on the right lower extremity.  He was then discharged on Xarelto which she took only for 1 month.  Few weeks ago he injured his left knee and was mainly staying in bed for 2 days as he stated.  He noted swelling on his left leg and then started having shortness of breath which worsened over the course of time.  Symptoms has worsened to the point he needed medical attention.  In the ER CT angiogram of the chest showed acute bilateral pulmonary emboli moderate to severe with saddle embolus present.  There was no evidence of right ventricular strain.  Doppler study of the lower extremities showed acute DVT of the left lower extremity.  Patient was started on heparin drip then switched to Xarelto.  Was found to have acute respiratory failure secondary to the bilateral PE.  Interval Problem Update  Resting comfortably in bed.  On 1-2 liters oxygen via nasal cannula.  Denies any chest pain.  No acute distress noted.  Hemodynamically stable.    Consultants/Specialty  None    Code Status  Full code    Disposition  Likely home once medically cleared    Review of Systems  Review of Systems   Constitutional: Positive for malaise/fatigue. Negative for chills and fever.   HENT: Negative for ear pain, hearing loss and tinnitus.    Eyes: Negative for blurred vision, double vision and photophobia.   Respiratory: Positive for shortness of breath. Negative for cough.    Cardiovascular: Positive for palpitations and leg swelling. Negative for orthopnea.   Gastrointestinal:  Negative for heartburn, nausea and vomiting.   Genitourinary: Negative for dysuria, frequency and urgency.   Musculoskeletal: Negative for back pain, myalgias and neck pain.   Skin: Negative for rash.   Neurological: Negative for dizziness, tingling and headaches.   Endo/Heme/Allergies: Does not bruise/bleed easily.   Psychiatric/Behavioral: Negative for depression, substance abuse and suicidal ideas.        Physical Exam  Temp:  [36.2 °C (97.1 °F)-36.9 °C (98.4 °F)] 36.4 °C (97.6 °F)  Pulse:  [57-96] 57  Resp:  [15-20] 16  BP: (101-123)/(56-72) 119/64  SpO2:  [92 %-97 %] 93 %    Physical Exam   Constitutional: He is oriented to person, place, and time. No distress.   HENT:   Head: Normocephalic and atraumatic.   Mouth/Throat: No oropharyngeal exudate.   Eyes: Pupils are equal, round, and reactive to light. No scleral icterus.   Neck: Normal range of motion. No tracheal deviation present. No thyromegaly present.   Cardiovascular: Normal rate and regular rhythm.  Exam reveals no gallop and no friction rub.    Pulmonary/Chest: Effort normal. He has decreased breath sounds.   Abdominal: Soft. He exhibits no distension. There is no tenderness.   Musculoskeletal: He exhibits no deformity.   Mild swelling noted on the left leg.  No calf tenderness.   Neurological: He is alert and oriented to person, place, and time.   Skin: Skin is warm and dry. He is not diaphoretic.   Psychiatric: He has a normal mood and affect. His behavior is normal.       Fluids  No intake or output data in the 24 hours ending 05/30/19 1536    Laboratory  Recent Labs      05/29/19   1157  05/30/19   0054   WBC  7.7  7.4   RBC  4.72  4.38*   HEMOGLOBIN  14.4  13.1*   HEMATOCRIT  43.7  40.7*   MCV  92.6  92.9   MCH  30.5  29.9   MCHC  33.0*  32.2*   RDW  43.8  44.5   PLATELETCT  177  173   MPV  9.3  9.0     Recent Labs      05/29/19   1157  05/30/19   0054   SODIUM  138  137   POTASSIUM  3.8  4.2   CHLORIDE  105  103   CO2  26  25   GLUCOSE  105*   127*   BUN  11  15   CREATININE  0.94  1.13   CALCIUM  9.3  8.9     Recent Labs      05/29/19   2138  05/30/19   0449  05/30/19   1159   APTT  39.9*  58.7*  50.8*               Imaging  EC-ECHOCARDIOGRAM COMPLETE W/O CONT         US-EXTREMITY VENOUS LOWER UNILAT LEFT   Final Result      CT-CTA CHEST PULMONARY ARTERY W/ RECONS   Final Result      1.  Acute bilateral pulmonary emboli, moderate to severe overall clot burden with subtle embolus present.   2.  Prominent pulmonary arteries suggesting pulmonary hypertension, without gross evidence for RIGHT ventricular strain although LEFT ventricle appears enlarged.   3.  Stable small RIGHT lung nodules measuring up to 5 mm.      These findings were discussed with ZOYA RAJAN on 5/29/2019 2:05 PM.               DX-CHEST-PORTABLE (1 VIEW)   Final Result      No acute cardiopulmonary disease.           Assessment/Plan  Acute respiratory failure with hypoxia (HCC)- (present on admission)   Assessment & Plan    Secondary to above.  Wean off oxygen as tolerated.     Pulmonary embolism (HCC)- (present on admission)   Assessment & Plan    Acute bilateral pulmonary embolism  Acute DVT on Left LE  History of DVT PE 2 years ago but did not finish anticoagulation as recommended  I started patient on heparin infusion, I am actively managing heparin rate and monitoring APTT  [place] the [place] consider discharging him on annual anticoagulation  I also order echocardiogram   5/30: I discontinued heparin drip and started patient on Xarelto 15 mg p.o. twice daily for 21 days then 20 mg daily after that.  Echocardiogram pending.     Nicotine abuse- (present on admission)   Assessment & Plan    Patient was counseled.  Nicotine replacement available per protocol.     Hyperglycemia- (present on admission)   Assessment & Plan    No history of diabetes     Acute deep vein thrombosis (DVT) of femoral vein of left lower extremity (HCC)- (present on  admission)   Assessment & Plan    Plan as above          VTE prophylaxis: Xarelto

## 2019-05-30 NOTE — PROGRESS NOTES
Received bedside report. Patient alert and oriented. No overnight events. Discussed POC and answered questions. Call light within reach

## 2019-05-30 NOTE — CARE PLAN
Problem: Safety  Goal: Will remain free from injury  Discussed with the patient the importance of calling for assistance prior to getting out of bed in order to help prevent falls. Patient accepting of the information. All questions answered at this time.     Problem: Infection  Goal: Will remain free from infection  Outcome: PROGRESSING AS EXPECTED  Discussed with the patient the importance of washing hands before and after eating or using the restroom in order to help prevent infections. Patient accepting of the information. All questions answered at this time.

## 2019-05-30 NOTE — PROGRESS NOTES
Patient arrived on floor via gurney with Marjorie SANTIAGO. Patient placed on tele monitor, monitor room notified. Patient is SR on the monitor at this time. Patient states minimal pain to left back of leg but denies any other needs at this time. Plan of care discussed with patient. Bed locked and in the lowest position with call light within reach.

## 2019-05-30 NOTE — PROGRESS NOTES
2 RN skin check completed with Veronica SANTIAGO.     Small rash to back of right calf   Warmth with minimal swelling to left upper calf  Bilat feet red/dry/blanching  Sacrum pink/blanching

## 2019-05-30 NOTE — RESPIRATORY CARE
COPD EDUCATION by COPD CLINICAL EDUCATOR  5/30/2019 at 8:03 AM by Anaya Hamilton     Patient's chart reviewed by COPD education team. Patient does not have an order for Respiratory Care Protocol, therefore the COPD education team cannot treat.

## 2019-05-31 LAB
ALBUMIN SERPL BCP-MCNC: 4 G/DL (ref 3.2–4.9)
ALBUMIN/GLOB SERPL: 1.2 G/DL
ALP SERPL-CCNC: 68 U/L (ref 30–99)
ALT SERPL-CCNC: 18 U/L (ref 2–50)
ANION GAP SERPL CALC-SCNC: 9 MMOL/L (ref 0–11.9)
AST SERPL-CCNC: 16 U/L (ref 12–45)
BASOPHILS # BLD AUTO: 0.9 % (ref 0–1.8)
BASOPHILS # BLD: 0.07 K/UL (ref 0–0.12)
BILIRUB SERPL-MCNC: 0.5 MG/DL (ref 0.1–1.5)
BUN SERPL-MCNC: 13 MG/DL (ref 8–22)
CALCIUM SERPL-MCNC: 9.2 MG/DL (ref 8.5–10.5)
CHLORIDE SERPL-SCNC: 104 MMOL/L (ref 96–112)
CO2 SERPL-SCNC: 25 MMOL/L (ref 20–33)
CREAT SERPL-MCNC: 1.01 MG/DL (ref 0.5–1.4)
EOSINOPHIL # BLD AUTO: 0.4 K/UL (ref 0–0.51)
EOSINOPHIL NFR BLD: 4.9 % (ref 0–6.9)
ERYTHROCYTE [DISTWIDTH] IN BLOOD BY AUTOMATED COUNT: 42.5 FL (ref 35.9–50)
GLOBULIN SER CALC-MCNC: 3.3 G/DL (ref 1.9–3.5)
GLUCOSE SERPL-MCNC: 112 MG/DL (ref 65–99)
HCT VFR BLD AUTO: 41.3 % (ref 42–52)
HGB BLD-MCNC: 13.3 G/DL (ref 14–18)
IMM GRANULOCYTES # BLD AUTO: 0.05 K/UL (ref 0–0.11)
IMM GRANULOCYTES NFR BLD AUTO: 0.6 % (ref 0–0.9)
LYMPHOCYTES # BLD AUTO: 1.8 K/UL (ref 1–4.8)
LYMPHOCYTES NFR BLD: 22 % (ref 22–41)
MCH RBC QN AUTO: 29.6 PG (ref 27–33)
MCHC RBC AUTO-ENTMCNC: 32.2 G/DL (ref 33.7–35.3)
MCV RBC AUTO: 92 FL (ref 81.4–97.8)
MONOCYTES # BLD AUTO: 0.95 K/UL (ref 0–0.85)
MONOCYTES NFR BLD AUTO: 11.6 % (ref 0–13.4)
NEUTROPHILS # BLD AUTO: 4.93 K/UL (ref 1.82–7.42)
NEUTROPHILS NFR BLD: 60 % (ref 44–72)
NRBC # BLD AUTO: 0 K/UL
NRBC BLD-RTO: 0 /100 WBC
PLATELET # BLD AUTO: 212 K/UL (ref 164–446)
PMV BLD AUTO: 9.3 FL (ref 9–12.9)
POTASSIUM SERPL-SCNC: 4.1 MMOL/L (ref 3.6–5.5)
PROT SERPL-MCNC: 7.3 G/DL (ref 6–8.2)
RBC # BLD AUTO: 4.49 M/UL (ref 4.7–6.1)
SODIUM SERPL-SCNC: 138 MMOL/L (ref 135–145)
WBC # BLD AUTO: 8.2 K/UL (ref 4.8–10.8)

## 2019-05-31 PROCEDURE — 700102 HCHG RX REV CODE 250 W/ 637 OVERRIDE(OP): Performed by: INTERNAL MEDICINE

## 2019-05-31 PROCEDURE — 700102 HCHG RX REV CODE 250 W/ 637 OVERRIDE(OP): Performed by: FAMILY MEDICINE

## 2019-05-31 PROCEDURE — 99232 SBSQ HOSP IP/OBS MODERATE 35: CPT | Performed by: FAMILY MEDICINE

## 2019-05-31 PROCEDURE — 80053 COMPREHEN METABOLIC PANEL: CPT

## 2019-05-31 PROCEDURE — 85025 COMPLETE CBC W/AUTO DIFF WBC: CPT

## 2019-05-31 PROCEDURE — 36415 COLL VENOUS BLD VENIPUNCTURE: CPT

## 2019-05-31 PROCEDURE — A9270 NON-COVERED ITEM OR SERVICE: HCPCS | Performed by: FAMILY MEDICINE

## 2019-05-31 PROCEDURE — 770020 HCHG ROOM/CARE - TELE (206)

## 2019-05-31 PROCEDURE — A9270 NON-COVERED ITEM OR SERVICE: HCPCS | Performed by: INTERNAL MEDICINE

## 2019-05-31 RX ADMIN — RIVAROXABAN 15 MG: 15 TABLET, FILM COATED ORAL at 05:22

## 2019-05-31 RX ADMIN — OXYCODONE HYDROCHLORIDE 5 MG: 5 TABLET ORAL at 19:27

## 2019-05-31 RX ADMIN — RIVAROXABAN 15 MG: 15 TABLET, FILM COATED ORAL at 16:54

## 2019-05-31 RX ADMIN — SENNOSIDES,DOCUSATE SODIUM 2 TABLET: 50; 8.6 TABLET, FILM COATED ORAL at 16:54

## 2019-05-31 RX ADMIN — SENNOSIDES,DOCUSATE SODIUM 2 TABLET: 50; 8.6 TABLET, FILM COATED ORAL at 09:11

## 2019-05-31 RX ADMIN — ACETAMINOPHEN 650 MG: 325 TABLET, FILM COATED ORAL at 09:10

## 2019-05-31 ASSESSMENT — ENCOUNTER SYMPTOMS
BLURRED VISION: 0
MYALGIAS: 0
PALPITATIONS: 0
TINGLING: 0
VOMITING: 0
COUGH: 0
DIZZINESS: 0
DOUBLE VISION: 0
ORTHOPNEA: 0
BRUISES/BLEEDS EASILY: 0
SHORTNESS OF BREATH: 1
FEVER: 0
BACK PAIN: 0
PHOTOPHOBIA: 0
DEPRESSION: 0
NECK PAIN: 0
CHILLS: 0
NAUSEA: 0
HEARTBURN: 0
HEADACHES: 0

## 2019-05-31 ASSESSMENT — LIFESTYLE VARIABLES: SUBSTANCE_ABUSE: 0

## 2019-05-31 NOTE — PROGRESS NOTES
Assumed care at 0700. Bedside report received from Mariel. Patient's chart and MAR reviewed. Pt complains of left leg pain w/ ambulation at this time. Pt is A & O 4. Patient was updated on plan of care for the day. Questions answered and concerns addressed.  Pt denies any additional needs at this time. White board updated. Call light, phone and personal belongings within reach.

## 2019-05-31 NOTE — PROGRESS NOTES
Hospital Medicine Daily Progress Note    Date of Service  5/31/2019    Chief Complaint  54 y.o. male admitted 5/29/2019 with shortness of breath    Hospital Course  This is a 54 years old male with past medical history of clotting disorder, history of PE and DVT who comes in with shortness of breath.  Patient has a history of massive PE in 2017 that required alteplase and hospitalization in the intensive care unit.  He was also found to have acute and subacute DVT on the right lower extremity.  He was then discharged on Xarelto which she took only for 1 month.  Few weeks ago he injured his left knee and was mainly staying in bed for 2 days as he stated.  He noted swelling on his left leg and then started having shortness of breath which worsened over the course of time.  Symptoms has worsened to the point he needed medical attention.  In the ER CT angiogram of the chest showed acute bilateral pulmonary emboli moderate to severe with saddle embolus present.  There was no evidence of right ventricular strain.  Doppler study of the lower extremities showed acute DVT of the left lower extremity.  Patient was started on heparin drip then switched to Xarelto.  Was found to have acute respiratory failure secondary to the bilateral PE.  Interval Problem Update  Resting comfortably in bed.  On 1-2 liters oxygen via nasal cannula.  Denies any chest pain.  No acute distress noted.  Hemodynamically stable.  5/31: Feels tired today. Swelling on the leg has improved but complaining of pain with ambulation. Respiratory status has been stable. No distress noted. No issues overnight per staff.   Consultants/Specialty  None    Code Status  Full code    Disposition  Likely home once medically cleared    Review of Systems  Review of Systems   Constitutional: Positive for malaise/fatigue. Negative for chills and fever.   HENT: Negative for ear discharge, ear pain, hearing loss and tinnitus.    Eyes: Negative for blurred vision, double  vision and photophobia.   Respiratory: Positive for shortness of breath (Improving ). Negative for cough.    Cardiovascular: Positive for leg swelling. Negative for palpitations and orthopnea.   Gastrointestinal: Negative for heartburn, nausea and vomiting.   Genitourinary: Negative for dysuria, frequency and urgency.   Musculoskeletal: Negative for back pain, myalgias and neck pain.   Skin: Negative for rash.   Neurological: Negative for dizziness, tingling and headaches.   Endo/Heme/Allergies: Does not bruise/bleed easily.   Psychiatric/Behavioral: Negative for depression, substance abuse and suicidal ideas.        Physical Exam  Temp:  [36.4 °C (97.6 °F)-37.8 °C (100 °F)] 36.4 °C (97.6 °F)  Pulse:  [66-89] 66  Resp:  [16-18] 16  BP: (113-135)/(68-84) 125/76  SpO2:  [93 %-97 %] 95 %    Physical Exam   Constitutional: He is oriented to person, place, and time. No distress.   HENT:   Head: Normocephalic and atraumatic.   Mouth/Throat: No oropharyngeal exudate.   Eyes: Pupils are equal, round, and reactive to light. No scleral icterus.   Neck: Normal range of motion. No JVD present. No tracheal deviation present. No thyromegaly present.   Cardiovascular: Normal rate and regular rhythm.  Exam reveals no gallop and no friction rub.    Pulmonary/Chest: Effort normal. He has decreased breath sounds.   Abdominal: Soft. He exhibits no distension. There is no tenderness.   Musculoskeletal: He exhibits no deformity.   Mild swelling noted on the left leg.  No calf tenderness.   Neurological: He is alert and oriented to person, place, and time. No cranial nerve deficit.   Skin: Skin is warm and dry. He is not diaphoretic. No erythema.   Psychiatric: He has a normal mood and affect. His behavior is normal.       Fluids    Intake/Output Summary (Last 24 hours) at 05/31/19 1643  Last data filed at 05/30/19 2100   Gross per 24 hour   Intake              240 ml   Output                0 ml   Net              240 ml        Laboratory  Recent Labs      05/29/19   1157  05/30/19   0054  05/31/19   0301   WBC  7.7  7.4  8.2   RBC  4.72  4.38*  4.49*   HEMOGLOBIN  14.4  13.1*  13.3*   HEMATOCRIT  43.7  40.7*  41.3*   MCV  92.6  92.9  92.0   MCH  30.5  29.9  29.6   MCHC  33.0*  32.2*  32.2*   RDW  43.8  44.5  42.5   PLATELETCT  177  173  212   MPV  9.3  9.0  9.3     Recent Labs      05/29/19   1157 05/30/19   0054  05/31/19   0301   SODIUM  138  137  138   POTASSIUM  3.8  4.2  4.1   CHLORIDE  105  103  104   CO2  26  25  25   GLUCOSE  105*  127*  112*   BUN  11  15  13   CREATININE  0.94  1.13  1.01   CALCIUM  9.3  8.9  9.2     Recent Labs      05/29/19 2138  05/30/19   0449  05/30/19   1159   APTT  39.9*  58.7*  50.8*               Imaging  EC-ECHOCARDIOGRAM COMPLETE W/O CONT   Final Result      US-EXTREMITY VENOUS LOWER UNILAT LEFT   Final Result      CT-CTA CHEST PULMONARY ARTERY W/ RECONS   Final Result      1.  Acute bilateral pulmonary emboli, moderate to severe overall clot burden with subtle embolus present.   2.  Prominent pulmonary arteries suggesting pulmonary hypertension, without gross evidence for RIGHT ventricular strain although LEFT ventricle appears enlarged.   3.  Stable small RIGHT lung nodules measuring up to 5 mm.      These findings were discussed with ZOYA RAJAN on 5/29/2019 2:05 PM.               DX-CHEST-PORTABLE (1 VIEW)   Final Result      No acute cardiopulmonary disease.           Assessment/Plan  Acute respiratory failure with hypoxia (HCC)- (present on admission)   Assessment & Plan    Secondary to above.  Wean off oxygen as tolerated.     Pulmonary embolism (HCC)- (present on admission)   Assessment & Plan    Acute bilateral pulmonary embolism  Acute DVT on Left LE  History of DVT PE 2 years ago but did not finish anticoagulation as recommended  I started patient on heparin infusion, I am actively managing heparin rate and monitoring APTT  [place] the [place]  consider discharging him on annual anticoagulation  I also order echocardiogram   5/30: I discontinued heparin drip and started patient on Xarelto 15 mg p.o. twice daily for 21 days then 20 mg daily after that.  Echocardiogram pending.  5/31: Echocardiogram unremarkable. No right heat strain.      Nicotine abuse- (present on admission)   Assessment & Plan    Patient was counseled.  Nicotine replacement available per protocol.     Hyperglycemia- (present on admission)   Assessment & Plan    No history of diabetes     Acute deep vein thrombosis (DVT) of femoral vein of left lower extremity (HCC)- (present on admission)   Assessment & Plan    Plan as above     Plan of care discussed with multidisciplinary team during rounds    VTE prophylaxis: Xarelto

## 2019-05-31 NOTE — CARE PLAN
Problem: Safety  Goal: Will remain free from falls  Outcome: PROGRESSING AS EXPECTED  Pt educated on the importance fall prevention methods, such as treaded sock and the bed alarm. Pt stated they will use the call light prior to any attempts of ambulation. Ambulatory ability assessed, treaded socks in place, bed locked and in low position, frequent trips to bathroom offered, and call light and phone within reach.    Problem: Knowledge Deficit  Goal: Knowledge of the prescribed therapeutic regimen will improve  Outcome: PROGRESSING AS EXPECTED  Pt educated regarding plan of care and medications. All questions answered.

## 2019-05-31 NOTE — DISCHARGE PLANNING
Anticipated Discharge Disposition: D/C to Home/Self-Care    Action: LSW contacted [facility] and confirmed that pt's Xarelto is fully covered, no co-pays or prior auth required. Pt's medications will be ready for pick-up. RN aware.    Barriers to Discharge: None.    Plan: No further d/c planning needs anticipated at this time.

## 2019-05-31 NOTE — DISCHARGE PLANNING
Anticipated Discharge Disposition: D/C to Home/Self-Care    Action: LSW called pt's pharmacy, [facility], to check on any prior auth/co-pay needs of pt's Xarelto. Pharmacy did not have any insurance information on file for pt and the med was running for cash price. LSW advised that pt has active [employer] HMO and provided SSN to run pt in system. Pharmacy staff requested LSW call back in approximately 20 minutes to check on the status.    Barriers to Discharge: Potential prior auth or co-pay needs.     Plan: LSW to f/u with [facility] in approximately 20 minutes to check on coverage of pt's Xarelto.

## 2019-06-01 ENCOUNTER — PATIENT OUTREACH (OUTPATIENT)
Dept: HEALTH INFORMATION MANAGEMENT | Facility: OTHER | Age: 54
End: 2019-06-01

## 2019-06-01 VITALS
TEMPERATURE: 97.3 F | BODY MASS INDEX: 32.22 KG/M2 | SYSTOLIC BLOOD PRESSURE: 122 MMHG | HEIGHT: 78 IN | HEART RATE: 91 BPM | WEIGHT: 278.44 LBS | RESPIRATION RATE: 16 BRPM | OXYGEN SATURATION: 99 % | DIASTOLIC BLOOD PRESSURE: 96 MMHG

## 2019-06-01 PROBLEM — J96.01 ACUTE RESPIRATORY FAILURE WITH HYPOXIA (HCC): Status: RESOLVED | Noted: 2017-07-05 | Resolved: 2019-06-01

## 2019-06-01 LAB
ALBUMIN SERPL BCP-MCNC: 3.9 G/DL (ref 3.2–4.9)
ALBUMIN/GLOB SERPL: 1.1 G/DL
ALP SERPL-CCNC: 68 U/L (ref 30–99)
ALT SERPL-CCNC: 25 U/L (ref 2–50)
ANION GAP SERPL CALC-SCNC: 6 MMOL/L (ref 0–11.9)
AST SERPL-CCNC: 19 U/L (ref 12–45)
BASOPHILS # BLD AUTO: 0.8 % (ref 0–1.8)
BASOPHILS # BLD: 0.06 K/UL (ref 0–0.12)
BILIRUB SERPL-MCNC: 0.4 MG/DL (ref 0.1–1.5)
BUN SERPL-MCNC: 17 MG/DL (ref 8–22)
CALCIUM SERPL-MCNC: 9.3 MG/DL (ref 8.5–10.5)
CHLORIDE SERPL-SCNC: 102 MMOL/L (ref 96–112)
CO2 SERPL-SCNC: 25 MMOL/L (ref 20–33)
CREAT SERPL-MCNC: 0.9 MG/DL (ref 0.5–1.4)
EOSINOPHIL # BLD AUTO: 0.33 K/UL (ref 0–0.51)
EOSINOPHIL NFR BLD: 4.6 % (ref 0–6.9)
ERYTHROCYTE [DISTWIDTH] IN BLOOD BY AUTOMATED COUNT: 41.8 FL (ref 35.9–50)
GLOBULIN SER CALC-MCNC: 3.4 G/DL (ref 1.9–3.5)
GLUCOSE SERPL-MCNC: 111 MG/DL (ref 65–99)
HCT VFR BLD AUTO: 41 % (ref 42–52)
HGB BLD-MCNC: 13.7 G/DL (ref 14–18)
IMM GRANULOCYTES # BLD AUTO: 0.05 K/UL (ref 0–0.11)
IMM GRANULOCYTES NFR BLD AUTO: 0.7 % (ref 0–0.9)
LYMPHOCYTES # BLD AUTO: 1.64 K/UL (ref 1–4.8)
LYMPHOCYTES NFR BLD: 22.8 % (ref 22–41)
MCH RBC QN AUTO: 30.2 PG (ref 27–33)
MCHC RBC AUTO-ENTMCNC: 33.4 G/DL (ref 33.7–35.3)
MCV RBC AUTO: 90.5 FL (ref 81.4–97.8)
MONOCYTES # BLD AUTO: 0.87 K/UL (ref 0–0.85)
MONOCYTES NFR BLD AUTO: 12.1 % (ref 0–13.4)
NEUTROPHILS # BLD AUTO: 4.23 K/UL (ref 1.82–7.42)
NEUTROPHILS NFR BLD: 59 % (ref 44–72)
NRBC # BLD AUTO: 0 K/UL
NRBC BLD-RTO: 0 /100 WBC
PLATELET # BLD AUTO: 210 K/UL (ref 164–446)
PMV BLD AUTO: 9.4 FL (ref 9–12.9)
POTASSIUM SERPL-SCNC: 4.4 MMOL/L (ref 3.6–5.5)
PROT SERPL-MCNC: 7.3 G/DL (ref 6–8.2)
RBC # BLD AUTO: 4.53 M/UL (ref 4.7–6.1)
SODIUM SERPL-SCNC: 133 MMOL/L (ref 135–145)
WBC # BLD AUTO: 7.2 K/UL (ref 4.8–10.8)

## 2019-06-01 PROCEDURE — 85025 COMPLETE CBC W/AUTO DIFF WBC: CPT

## 2019-06-01 PROCEDURE — 36415 COLL VENOUS BLD VENIPUNCTURE: CPT

## 2019-06-01 PROCEDURE — 80053 COMPREHEN METABOLIC PANEL: CPT

## 2019-06-01 PROCEDURE — 99239 HOSP IP/OBS DSCHRG MGMT >30: CPT | Performed by: FAMILY MEDICINE

## 2019-06-01 PROCEDURE — 700102 HCHG RX REV CODE 250 W/ 637 OVERRIDE(OP): Performed by: FAMILY MEDICINE

## 2019-06-01 PROCEDURE — A9270 NON-COVERED ITEM OR SERVICE: HCPCS | Performed by: FAMILY MEDICINE

## 2019-06-01 RX ORDER — OMEPRAZOLE 20 MG/1
20 CAPSULE, DELAYED RELEASE ORAL DAILY
Qty: 30 CAP | Refills: 0 | Status: SHIPPED | OUTPATIENT
Start: 2019-06-01

## 2019-06-01 RX ADMIN — RIVAROXABAN 15 MG: 15 TABLET, FILM COATED ORAL at 05:05

## 2019-06-01 NOTE — PROGRESS NOTES
Assumed care at 0700. Bedside report received from Amelia. Patient's chart and MAR reviewed. Pt complains of ongoing LLE  pain at this time, worse with ambulation. Pt is A & O 4. Patient was updated on plan of care for the day. Questions answered and concerns addressed.  Pt denies any additional needs at this time. White board updated. Call light, phone and personal belongings within reach.

## 2019-06-01 NOTE — CARE PLAN
Problem: Communication  Goal: The ability to communicate needs accurately and effectively will improve  Outcome: PROGRESSING AS EXPECTED  Patient is able to communicate needs    Problem: Safety  Goal: Will remain free from injury  Outcome: PROGRESSING AS EXPECTED  Patient remains free from injury    Problem: Venous Thromboembolism (VTW)/Deep Vein Thrombosis (DVT) Prevention:  Goal: Patient will participate in Venous Thrombosis (VTE)/Deep Vein Thrombosis (DVT)Prevention Measures  Outcome: PROGRESSING AS EXPECTED      Problem: Knowledge Deficit  Goal: Knowledge of disease process/condition, treatment plan, diagnostic tests, and medications will improve  Outcome: PROGRESSING AS EXPECTED  Patient verbalizes understanding

## 2019-06-01 NOTE — PROGRESS NOTES
Pt dc'd at 1245. IV and monitor removed; monitor room notified. Pt left unit via ambulation with friend. Personal belongings with pt when leaving unit. Pt given discharge instructions prior to leaving unit including prescription and when to visit with physician; verbalizes understanding. Copy of discharge instructions with pt and in the chart.

## 2019-06-01 NOTE — DISCHARGE INSTRUCTIONS
Discharge Instructions    Discharged to home by car with friend. Discharged via wheelchair, hospital escort: Yes.  Special equipment needed: Not Applicable    Be sure to schedule a follow-up appointment with your primary care doctor or any specialists as instructed.     Discharge Plan:   Diet Plan: Discussed  Activity Level: Discussed  Smoking Cessation Offered: Patient Refused  Confirmed Follow up Appointment: Patient to Call and Schedule Appointment  Confirmed Symptoms Management: Discussed  Medication Reconciliation Updated: Yes  Pneumococcal Vaccine Administered/Refused: Not given - Patient refused pneumococcal vaccine  Influenza Vaccine Indication: Patient Refuses    I understand that a diet low in cholesterol, fat, and sodium is recommended for good health. Unless I have been given specific instructions below for another diet, I accept this instruction as my diet prescription.   Other diet:   Heart-Healthy Eating Plan  Introduction  Heart-healthy meal planning includes:  · Limiting unhealthy fats.  · Increasing healthy fats.  · Making other small dietary changes.  You may need to talk with your doctor or a diet specialist (dietitian) to create an eating plan that is right for you.  What types of fat should I choose?  · Choose healthy fats. These include olive oil and canola oil, flaxseeds, walnuts, almonds, and seeds.  · Eat more omega-3 fats. These include salmon, mackerel, sardines, tuna, flaxseed oil, and ground flaxseeds. Try to eat fish at least twice each week.  · Limit saturated fats.  ¨ Saturated fats are often found in animal products, such as meats, butter, and cream.  ¨ Plant sources of saturated fats include palm oil, palm kernel oil, and coconut oil.  · Avoid foods with partially hydrogenated oils in them. These include stick margarine, some tub margarines, cookies, crackers, and other baked goods. These contain trans fats.  What general guidelines do I need to follow?  · Check food labels  "carefully. Identify foods with trans fats or high amounts of saturated fat.  · Fill one half of your plate with vegetables and green salads. Eat 4-5 servings of vegetables per day. A serving of vegetables is:  ¨ 1 cup of raw leafy vegetables.  ¨ ½ cup of raw or cooked cut-up vegetables.  ¨ ½ cup of vegetable juice.  · Fill one fourth of your plate with whole grains. Look for the word \"whole\" as the first word in the ingredient list.  · Fill one fourth of your plate with lean protein foods.  · Eat 4-5 servings of fruit per day. A serving of fruit is:  ¨ One medium whole fruit.  ¨ ¼ cup of dried fruit.  ¨ ½ cup of fresh, frozen, or canned fruit.  ¨ ½ cup of 100% fruit juice.  · Eat more foods that contain soluble fiber. These include apples, broccoli, carrots, beans, peas, and barley. Try to get 20-30 g of fiber per day.  · Eat more home-cooked food. Eat less restaurant, buffet, and fast food.  · Limit or avoid alcohol.  · Limit foods high in starch and sugar.  · Avoid fried foods.  · Avoid frying your food. Try baking, boiling, grilling, or broiling it instead. You can also reduce fat by:  ¨ Removing the skin from poultry.  ¨ Removing all visible fats from meats.  ¨ Skimming the fat off of stews, soups, and gravies before serving them.  ¨ Steaming vegetables in water or broth.  · Lose weight if you are overweight.  · Eat 4-5 servings of nuts, legumes, and seeds per week:  ¨ One serving of dried beans or legumes equals ½ cup after being cooked.  ¨ One serving of nuts equals 1½ ounces.  ¨ One serving of seeds equals ½ ounce or one tablespoon.  · You may need to keep track of how much salt or sodium you eat. This is especially true if you have high blood pressure. Talk with your doctor or dietitian to get more information.  What foods can I eat?  Grains   Breads, including [place], white, reji, wheat, raisin, rye, oatmeal, and Italian. Tortillas that are neither fried nor made with lard or trans fat. Low-fat rolls, " including hotdog and hamburger buns and English muffins. Biscuits. Muffins. Waffles. Pancakes. Light popcorn. Whole-grain cereals. Flatbread. Sara toast. Pretzels. Breadsticks. Rusks. Low-fat snacks. Low-fat crackers, including oyster, saltine, matzo, colleen, animal, and rye. Rice and pasta, including brown rice and pastas that are made with whole wheat.  Vegetables   All vegetables.  Fruits   All fruits, but limit coconut.  Meats and Other Protein Sources   Lean, well-trimmed beef, veal, pork, and lamb. Chicken and turkey without skin. All fish and shellfish. Wild duck, rabbit, pheasant, and venison. Egg whites or low-cholesterol egg substitutes. Dried beans, peas, lentils, and tofu. Seeds and most nuts.  Dairy   Low-fat or nonfat cheeses, including ricotta, string, and mozzarella. Skim or 1% milk that is liquid, powdered, or evaporated. Buttermilk that is made with low-fat milk. Nonfat or low-fat yogurt.  Beverages   Mineral water. Diet carbonated beverages.  Sweets and Desserts   Sherbets and fruit ices. Honey, jam, marmalade, jelly, and syrups. Meringues and gelatins. Pure sugar candy, such as hard candy, jelly beans, gumdrops, mints, marshmallows, and small amounts of dark chocolate. Yash food cake.  Eat all sweets and desserts in moderation.  Fats and Oils   Nonhydrogenated (trans-free) margarines. Vegetable oils, including soybean, sesame, sunflower, olive, peanut, safflower, corn, canola, and cottonseed. Salad dressings or mayonnaise made with a vegetable oil. Limit added fats and oils that you use for cooking, baking, salads, and as spreads.  Other   Cocoa powder. Coffee and tea. All seasonings and condiments.  The items listed above may not be a complete list of recommended foods or beverages. Contact your dietitian for more options.   What foods are not recommended?  Grains   Breads that are made with saturated or trans fats, oils, or whole milk. Croissants. Butter rolls. Cheese breads. Sweet rolls.  Donuts. Buttered popcorn. Chow mein noodles. High-fat crackers, such as cheese or butter crackers.  Meats and Other Protein Sources   Fatty meats, such as hotdogs, short ribs, sausage, spareribs, menon, rib eye roast or steak, and mutton. High-fat deli meats, such as salami and bologna. Caviar. Domestic duck and goose. Organ meats, such as kidney, liver, sweetbreads, and heart.  Dairy   Cream, sour cream, cream cheese, and creamed cottage cheese. Whole-milk cheeses, including blue (venessa), [place], Brie, Karthik, American, Havarti, Swiss, cheddar, Camembert, and [place]. Whole or 2% milk that is liquid, evaporated, or condensed. Whole buttermilk. Cream sauce or high-fat cheese sauce. Yogurt that is made from whole milk.  Beverages   Regular sodas and juice drinks with added sugar.  Sweets and Desserts   Frosting. Pudding. Cookies. Cakes other than mike food cake. Candy that has milk chocolate or white chocolate, hydrogenated fat, butter, coconut, or unknown ingredients. Buttered syrups. Full-fat ice cream or ice cream drinks.  Fats and Oils   Gravy that has suet, meat fat, or shortening. Cocoa butter, hydrogenated oils, palm oil, coconut oil, palm kernel oil. These can often be found in baked products, candy, fried foods, nondairy creamers, and whipped toppings. Solid fats and shortenings, including menon fat, salt pork, lard, and butter. Nondairy cream substitutes, such as coffee creamers and sour cream substitutes. Salad dressings that are made of unknown oils, cheese, or sour cream.  The items listed above may not be a complete list of foods and beverages to avoid. Contact your dietitian for more information.   This information is not intended to replace advice given to you by your health care provider. Make sure you discuss any questions you have with your health care provider.  Document Released: 06/18/2013 Document Revised: 05/25/2017 Document Reviewed: 06/11/2015  © 2017 [employer]      Special  Instructions:   Deep Vein Thrombosis Discharge Instructions    A deep vein thrombosis (DVT) is a blood clot (thrombus) that develops in a deep vein. A DVT is a clot in the deep, larger veins of the leg, arm, or pelvis. These are more dangerous than clots that might form in veins on the surface of the body. Deep vein thrombosis can lead to complications if the clot breaks off and travels in the bloodstream to the lungs.     CAUSES  Blood clots form in a vein for different reasons. Usually several things cause blood clots. They include:   · The flow of blood slows down.   · The inside of the vein is damaged in some way.   · The person has a condition that makes blood clot more easily. These conditions may include:  · Older age (especially over 75 years old).  · Having a history of blood clots.  · Having major or lengthy surgery. Hip surgery is particularly high-risk.   · Breaking a hip or leg.  · Sitting or lying still for a long time.  · Cancer or cancer treatment.  · Having a long, thin tube (catheter) placed inside a vein during a medical procedure.   · Being overweight (obese).  · Pregnancy and childbirth.  · Medicines with estrogen.  · Smoking.  · Other circulation or heart problems.     SYMPTOMS  When a clot forms, it can either partially or totally block the blood flow in that vein. Symptoms of a DVT can include:  · Swelling of the leg or arm, especially if one side is much worse.  · Warmth and redness of the leg or arm, especially if one side is much worse.   · Pain in an arm or leg. If the clot is in the leg, symptoms may be more noticeable or worse when standing or walking.  If the blood clot travels to the lung, it may cause:  · Shortness of breath.  · Chest pain. The pain may be worsened by deep breaths.   · Coughing up thick mucus (phlegm), possibly flecked with blood.   Anyone with these symptoms should get emergency medical treatment right away. Call your local emergency  Services (341 in [place] if you  have these symptoms.     DIAGNOSIS  If a DVT is suspected, your caregiver will take a full medical history. He or she will also perform a physical exam. Tests that also may be required include:   · Studies of the clotting properties of the blood.   · An ultrasound scan.   · X-rays to show the flow of blood when special dye is injected into the veins (venography).   · Studies of your lungs if you have any chest symptoms.     PREVENTION  · Exercise the legs regularly. Take a brisk 30 minute walk every day.   · Maintain a weight that is appropriate for your height.  · Avoid sitting or lying in bed for long periods of time without moving your legs.   · Women, particularly those over the age of 35, should consider the risks and benefits of taking estrogen medicine, including birth control pills.   · Do not smoke, especially if you take estrogen medicines.   · Long-distance travel can increase your risk. You should exercise your legs by walking or pumping the muscles every hour.   · In hospital prevention: Prevention may include medical and non medical measures.     TREATMENT  · The most common treatment for DVT is blood thinning (anticoagulant) medicine, which reduces the blood's tendency to clot. Anticoagulants can stop new blood clots from forming and old ones from growing. They cannot dissolve existing clots. Your body does this by itself over time. Anticoagulants can be given by mouth, by intravenous (IV) access, or by injection. Your caregiver will determine the best program for you.   · Less commonly, clot-dissolving drugs (thrombolytics) are used to dissolve a DVT. They carry a high risk of bleeding, so they are used mainly in severe cases.   · Very rarely, a blood clot in the leg needs to be removed surgically.   · If you are unable to take anticoagulants, your caregiver may arrange for you to have a filter placed in a main vein in your belly (abdomen). This filter prevents clots from traveling to your lungs.      HOME CARE INSTRUCTIONS  Take all medicines prescribed by your caregiver. Follow the directions carefully.   · You will most likely continue taking anticoagulants after you leave the hospital. Your caregiver will advise you on the length of treatment (usually 3 to 5 months, sometimes for life).   · Taking too much or too little of an anticoagulant is dangerous. While taking this type of medicine, you will need to have regular blood tests to be sure the dose is correct. The dose can change for many reasons. It is critically important that you take this medicine exactly as prescribed, and that you have blood tests exactly as directed.   · Many foods can interfere with anticoagulants. These include foods high in vitamin K, such as spinach, kale, broccoli, cabbage, latha and turnip greens, [place] sprouts, peas, cauliflower, seaweed, parsley, beef and pork liver, green tea, and soybean oil. Your caregiver should discuss limits on these foods with you or you should arrange a visit with a dietician to answer your questions.   · Many medicines can interfere with anticoagulants. You must tell your caregiver about any and all medicines you take. This includes all vitamins and supplements. Be especially cautious with aspirin and anti-inflammatory medicines. Ask your caregiver before taking these.   · Anticoagulants can have side effects, mostly excessive bruising or bleeding. You will need to hold pressure over cuts for longer than usual. Avoid alcoholic drinks or consume only very small amounts while taking this medicine.    If you are taking an anticoagulant:  · Wear a medical alert bracelet.  · Notify your dentist or other caregivers before procedures.  · Avoid contact sports.    · Ask your caregiver how soon you can go back to normal activities. Not being active can lead to new clots. Ask for a list of what you should and should not do.   · Exercise your lower leg muscles. This is important while traveling.   · You  may need to wear compression stockings. These are tight elastic stockings that apply pressure to the lower legs. This can help keep the blood in the legs from clotting.   · If you are a smoker, you should quit.   · Learn as much as you can about DVT.     SEEK MEDICAL CARE IF:  · You have unusual bruising or any bleeding problems.  · The swelling or pain in your affected arm or leg is not gradually improving.   · You anticipate surgery or long-distance travel. You should get specific advice on DVT prevention.   · You discover other family members with blood clots. This may require further testing for inherited diseases or conditions.     SEEK IMMEDIATE MEDICAL CARE IF:  · You develop chest pain.  · You develop severe shortness of breath.  · You begin to cough up bloody mucus or phlegm (sputum).  · You feel dizzy or faint.   · You develop swelling or pain in the leg.  · You have breathing problems after traveling.    MAKE SURE YOU:  · Understand these instructions.  · Will watch your condition.  · Will get help right away if you are not doing well or get worse.       · Is patient discharged on Warfarin / Coumadin?   No       Pulmonary Embolism  A pulmonary embolism (PE) is a sudden blockage or decrease of blood flow in one lung or both lungs. Most blockages come from a blood clot that travels from the legs or the pelvis to the lungs. PE is a dangerous and potentially life-threatening condition if it is not treated right away.  What are the causes?  A pulmonary embolism occurs most commonly when a blood clot travels from one of your veins to your lungs. Rarely, PE is caused by air, fat, amniotic fluid, or part of a tumor traveling through your veins to your lungs.  What increases the risk?  A PE is more likely to develop in:  · People who smoke.  · People who are older, especially over 60 years of age.  · People who are overweight (obese).  · People who sit or lie still for a long time, such as during long-distance  travel (over 4 hours), bed rest, hospitalization, or during recovery from certain medical conditions like a stroke.  · People who do not engage in much physical activity (sedentary lifestyle).  · People who have chronic breathing disorders.  · People who have a personal or family history of blood clots or blood clotting disease.  · People who have peripheral vascular disease (PVD), diabetes, or some types of cancer.  · People who have heart disease, especially if the person had a recent heart attack or has congestive heart failure.  · People who have neurological diseases that affect the legs (leg paresis).  · People who have had a traumatic injury, such as breaking a hip or leg.  · People who have recently had major or lengthy surgery, especially on the hip, knee, or abdomen.  · People who have had a central line placed inside a large vein.  · People who take medicines that contain the hormone estrogen. These include birth control pills and hormone replacement therapy.  · Pregnancy or during childbirth or the postpartum period.  What are the signs or symptoms?  The symptoms of a PE usually start suddenly and include:  · Shortness of breath while active or at rest.  · Coughing or coughing up blood or blood-tinged mucus.  · Chest pain that is often worse with deep breaths.  · Rapid or irregular heartbeat.  · Feeling light-headed or dizzy.  · Fainting.  · Feeling anxious.  · Sweating.  There may also be pain and swelling in a leg if that is where the blood clot started.  These symptoms may represent a serious problem that is an emergency. Do not wait to see if the symptoms will go away. Get medical help right away. Call your local emergency services (911 in the [place] Do not drive yourself to the hospital.   How is this diagnosed?  Your health care provider will take a medical history and perform a physical exam. You may also have other tests, including:  · Blood tests to assess the clotting properties of your blood,  assess oxygen levels in your blood, and find blood clots.  · Imaging tests, such as CT, ultrasound, MRI, X-ray, and other tests to see if you have clots anywhere in your body.  · An electrocardiogram (ECG) to look for heart strain from blood clots in the lungs.  How is this treated?  The main goals of PE treatment are:  · To stop a blood clot from growing larger.  · To stop new blood clots from forming.  The type of treatment that you receive depends on many factors, such as the cause of your PE, your risk for bleeding or developing more clots, and other medical conditions that you have. Sometimes, a combination of treatments is necessary.  This condition may be treated with:  · Medicines, including newer oral blood thinners (anticoagulants), warfarin, low molecular weight heparins, thrombolytics, or heparins.  · Wearing compression stockings or using different types of devices.  · Surgery (rare) to remove the blood clot or to place a filter in your abdomen to stop the blood clot from traveling to your lungs.  Treatments for a PE are often divided into immediate treatment, long-term treatment (up to 3 months after PE), and extended treatment (more than 3 months after PE). Your treatment may continue for several months. This is called maintenance therapy, and it is used to prevent the forming of new blood clots. You can work with your health care provider to choose the treatment program that is best for you.  What are anticoagulants?   Anticoagulants are medicines that treat PEs. They can stop current blood clots from growing and stop new clots from forming. They cannot dissolve existing clots. Your body dissolves clots by itself over time. Anticoagulants are given by mouth, by injection, or through an IV tube.  What are thrombolytics?   Thrombolytics are clot-dissolving medicines that are used to dissolve a PE. They carry a high risk of bleeding, so they tend to be used only in severe cases or if you have very low  blood pressure.  Follow these instructions at home:  If you are taking a newer oral anticoagulant:  · Take the medicine every single day at the same time each day.  · Understand what foods and drugs interact with this medicine.  · Understand that there are no regular blood tests required when using this medicine.  · Understand the side effects of this medicine, including excessive bruising or bleeding. Ask your health care provider or pharmacist about other possible side effects.  If you are taking warfarin:  · Understand how to take warfarin and know which foods can affect how warfarin works in your body.  · Understand that it is dangerous to take too much or too little warfarin. Too much warfarin increases the risk of bleeding. Too little warfarin continues to allow the risk for blood clots.  · Follow your PT and INR blood testing schedule. The PT and INR results allow your health care provider to adjust your dose of warfarin. It is very important that you have your PT and INR tested as often as told by your health care provider.  · Avoid major changes in your diet, or tell your health care provider before you change your diet. Arrange a visit with a registered dietitian to answer your questions. Many foods, especially foods that are high in vitamin K, can interfere with warfarin and affect the PT and INR results. Eat a consistent amount of foods that are high in vitamin K, such as:  ¨ Spinach, kale, broccoli, cabbage, latha greens, turnip greens, [place] sprouts, peas, cauliflower, seaweed, and parsley.  ¨ Beef liver and pork liver.  ¨ Green tea.  ¨ Soybean oil.  · Tell your health care provider about any and all medicines, vitamins, and supplements that you take, including aspirin and other over-the-counter anti-inflammatory medicines. Be especially cautious with aspirin and anti-inflammatory medicines. Do not take those before you ask your health care provider if it is safe to do so. This is important  because many medicines can interfere with warfarin and affect the PT and INR results.  · Do not start or stop taking any over-the-counter or prescription medicine unless your health care provider or pharmacist tells you to do so.  If you take warfarin, you will also need to do these things:  · Hold pressure over cuts for longer than usual.  · Tell your dentist and other health care providers that you are taking warfarin before you have any procedures in which bleeding may occur.  · Avoid alcohol or drink very small amounts. Tell your health care provider if you change your alcohol intake.  · Do not use tobacco products, including cigarettes, chewing tobacco, and e-cigarettes. If you need help quitting, ask your health care provider.  · Avoid contact sports.  General instructions  · Take over-the-counter and prescription medicines only as told by your health care provider. Anticoagulant medicines can have side effects, including easy bruising and difficulty stopping bleeding. If you are prescribed an anticoagulant, you will also need to do these things:  ¨ Hold pressure over cuts for longer than usual.  ¨ Tell your dentist and other health care providers that you are taking anticoagulants before you have any procedures in which bleeding may occur.  ¨ Avoid contact sports.  · Wear a medical alert bracelet or carry a medical alert card that says you have had a PE.  · Ask your health care provider how soon you can go back to your normal activities. Stay active to prevent new blood clots from forming.  · Make sure to exercise while traveling or when you have been sitting or standing for a long period of time. It is very important to exercise. Exercise your legs by walking or by tightening and relaxing your leg muscles often. Take frequent walks.  · Wear compression stockings as told by your health care provider to help prevent more blood clots from forming.  · Do not use tobacco products, including cigarettes, chewing  tobacco, and e-cigarettes. If you need help quitting, ask your health care provider.  · Keep all follow-up appointments with your health care provider. This is important.  How is this prevented?  Take these actions to decrease your risk of developing another PE:  · Exercise regularly. For at least 30 minutes every day, engage in:  ¨ Activity that involves moving your arms and legs.  ¨ Activity that encourages good blood flow through your body by increasing your heart rate.  · Exercise your arms and legs every hour during long-distance travel (over 4 hours). Drink plenty of water and avoid drinking alcohol while traveling.  · Avoid sitting or lying in bed for long periods of time without moving your legs.  · Maintain a weight that is appropriate for your height. Ask your health care provider what weight is healthy for you.  · If you are a woman who is over 35 years of age, avoid unnecessary use of medicines that contain estrogen. These include birth control pills.  · Do not smoke, especially if you take estrogen medicines. If you need help quitting, ask your health care provider.  · If you are at very high risk for PE, wear compression stockings.  · If you recently had a PE, have regularly scheduled ultrasound testing on your legs to check for new blood clots.  If you are hospitalized, prevention measures may include:  · Early walking after surgery, as soon as your health care provider says that it is safe.  · Receiving anticoagulants to prevent blood clots. If you cannot take anticoagulants, other options may be available, such as wearing compression stockings or using different types of devices.  Get help right away if:  · You have new or increased pain, swelling, or redness in an arm or leg.  · You have numbness or tingling in an arm or leg.  · You have shortness of breath while active or at rest.  · You have chest pain.  · You have a rapid or irregular heartbeat.  · You feel light-headed or dizzy.  · You cough up  blood.  · You notice blood in your vomit, bowel movement, or urine.  · You have a fever.  These symptoms may represent a serious problem that is an emergency. Do not wait to see if the symptoms will go away. Get medical help right away. Call your local emergency services (911 in the [place] Do not drive yourself to the hospital.   This information is not intended to replace advice given to you by your health care provider. Make sure you discuss any questions you have with your health care provider.  Document Released: 12/15/2001 Document Revised: 05/25/2017 Document Reviewed: 04/13/2016  [employer] Interactive Patient Education © 2017 [employer]      Deep Vein Thrombosis  A deep vein thrombosis (DVT) is a blood clot (thrombus) that usually occurs in a deep, larger vein of the lower leg or the pelvis, or in an upper extremity such as the arm. These are dangerous and can lead to serious and even life-threatening complications if the clot travels to the lungs.  A DVT can damage the valves in your leg veins so that instead of flowing upward, the blood pools in the lower leg. This is called post-thrombotic syndrome, and it can result in pain, swelling, discoloration, and sores on the leg.  What are the causes?  A DVT is caused by the formation of a blood clot in your leg, pelvis, or arm. Usually, several things contribute to the formation of blood clots. A clot may develop when:  · Your blood flow slows down.  · Your vein becomes damaged in some way.  · You have a condition that makes your blood clot more easily.  What increases the risk?  A DVT is more likely to develop in:  · People who are older, especially over 60 years of age.  · People who are overweight (obese).  · People who sit or lie still for a long time, such as during long-distance travel (over 4 hours), bed rest, hospitalization, or during recovery from certain medical conditions like a stroke.  · People who do not engage in much physical activity  (sedentary lifestyle).  · People who have chronic breathing disorders.  · People who have a personal or family history of blood clots or blood clotting disease.  · People who have peripheral vascular disease (PVD), diabetes, or some types of cancer.  · People who have heart disease, especially if the person had a recent heart attack or has congestive heart failure.  · People who have neurological diseases that affect the legs (leg paresis).  · People who have had a traumatic injury, such as breaking a hip or leg.  · People who have recently had major or lengthy surgery, especially on the hip, knee, or abdomen.  · People who have had a central line placed inside a large vein.  · People who take medicines that contain the hormone estrogen. These include birth control pills and hormone replacement therapy.  · Pregnancy or during childbirth or the postpartum period.  · Long plane flights (over 8 hours).  What are the signs or symptoms?     Symptoms of a DVT can include:  · Swelling of your leg or arm, especially if one side is much worse.  · Warmth and redness of your leg or arm, especially if one side is much worse.  · Pain in your arm or leg. If the clot is in your leg, symptoms may be more noticeable or worse when you stand or walk.  · A feeling of pins and needles, if the clot is in the arm.  The symptoms of a DVT that has traveled to the lungs (pulmonary embolism, PE) usually start suddenly and include:  · Shortness of breath while active or at rest.  · Coughing or coughing up blood or blood-tinged mucus.  · Chest pain that is often worse with deep breaths.  · Rapid or irregular heartbeat.  · Feeling light-headed or dizzy.  · Fainting.  · Feeling anxious.  · Sweating.  There may also be pain and swelling in a leg if that is where the blood clot started.  These symptoms may represent a serious problem that is an emergency. Do not wait to see if the symptoms will go away. Get medical help right away. Call your local  emergency services (911 in the [place] Do not drive yourself to the hospital.   How is this diagnosed?  Your health care provider will take a medical history and perform a physical exam. You may also have other tests, including:  · Blood tests to assess the clotting properties of your blood.  · Imaging tests, such as CT, ultrasound, MRI, X-ray, and other tests to see if you have clots anywhere in your body.  How is this treated?  After a DVT is identified, it can be treated. The type of treatment that you receive depends on many factors, such as the cause of your DVT, your risk for bleeding or developing more clots, and other medical conditions that you have. Sometimes, a combination of treatments is necessary. Treatment options may be combined and include:  · Monitoring the blood clot with ultrasound.  · Taking medicines by mouth, such as newer blood thinners (anticoagulants), thrombolytics, or warfarin.  · Taking anticoagulant medicine by injection or through an IV tube.  · Wearing compression stockings or using different types of devices.  · Surgery (rare) to remove the blood clot or to place a filter in your abdomen to stop the blood clot from traveling to your lungs.  Treatments for a DVT are often divided into immediate treatment and long-term treatment (up to 3 months after DVT). You can work with your health care provider to choose the treatment program that is best for you.  Follow these instructions at home:  If you are taking a newer oral anticoagulant:  · Take the medicine every single day at the same time each day.  · Understand what foods and drugs interact with this medicine.  · Understand that there are no regular blood tests required when using this medicine.  · Understand the side effects of this medicine, including excessive bruising or bleeding. Ask your health care provider or pharmacist about other possible side effects.  If you are taking warfarin:  · Understand how to take warfarin and know  which foods can affect how warfarin works in your body.  · Understand that it is dangerous to take too much or too little warfarin. Too much warfarin increases the risk of bleeding. Too little warfarin continues to allow the risk for blood clots.  · Follow your PT and INR blood testing schedule. The PT and INR results allow your health care provider to adjust your dose of warfarin. It is very important that you have your PT and INR tested as often as told by your health care provider.  · Avoid major changes in your diet, or tell your health care provider before you change your diet. Arrange a visit with a registered dietitian to answer your questions. Many foods, especially foods that are high in vitamin K, can interfere with warfarin and affect the PT and INR results. Eat a consistent amount of foods that are high in vitamin K, such as:  ¨ Spinach, kale, broccoli, cabbage, latha greens, turnip greens, [place] sprouts, peas, cauliflower, seaweed, and parsley.  ¨ Beef liver and pork liver.  ¨ Green tea.  ¨ Soybean oil.  · Tell your health care provider about any and all medicines, vitamins, and supplements that you take, including aspirin and other over-the-counter anti-inflammatory medicines. Be especially cautious with aspirin and anti-inflammatory medicines. Do not take those before you ask your health care provider if it is safe to do so. This is important because many medicines can interfere with warfarin and affect the PT and INR results.  · Do not start or stop taking any over-the-counter or prescription medicine unless your health care provider or pharmacist tells you to do so.  If you take warfarin, you will also need to do these things:  · Hold pressure over cuts for longer than usual.  · Tell your dentist and other health care providers that you are taking warfarin before you have any procedures in which bleeding may occur.  · Avoid alcohol or drink very small amounts. Tell your health care provider if  you change your alcohol intake.  · Do not use tobacco products, including cigarettes, chewing tobacco, and e-cigarettes. If you need help quitting, ask your health care provider.  · Avoid contact sports.  General instructions  · Take over-the-counter and prescription medicines only as told by your health care provider. Anticoagulant medicines can have side effects, including easy bruising and difficulty stopping bleeding. If you are prescribed an anticoagulant, you will also need to do these things:  ¨ Hold pressure over cuts for longer than usual.  ¨ Tell your dentist and other health care providers that you are taking anticoagulants before you have any procedures in which bleeding may occur.  ¨ Avoid contact sports.  · Wear a medical alert bracelet or carry a medical alert card that says you have had a PE.  · Ask your health care provider how soon you can go back to your normal activities. Stay active to prevent new blood clots from forming.  · Make sure to exercise while traveling or when you have been sitting or standing for a long period of time. It is very important to exercise. Exercise your legs by walking or by tightening and relaxing your leg muscles often. Take frequent walks.  · Wear compression stockings as told by your health care provider to help prevent more blood clots from forming.  · Do not use tobacco products, including cigarettes, chewing tobacco, and e-cigarettes. If you need help quitting, ask your health care provider.  · Keep all follow-up appointments with your health care provider. This is important.  How is this prevented?  Take these actions to decrease your risk of developing another DVT:  · Exercise regularly. For at least 30 minutes every day, engage in:  ¨ Activity that involves moving your arms and legs.  ¨ Activity that encourages good blood flow through your body by increasing your heart rate.  · Exercise your arms and legs every hour during long-distance travel (over 4 hours).  Drink plenty of water and avoid drinking alcohol while traveling.  · Avoid sitting or lying in bed for long periods of time without moving your legs.  · Maintain a weight that is appropriate for your height. Ask your health care provider what weight is healthy for you.  · If you are a woman who is over 35 years of age, avoid unnecessary use of medicines that contain estrogen. These include birth control pills.  · Do not smoke, especially if you take estrogen medicines. If you need help quitting, ask your health care provider.  If you are hospitalized, prevention measures may include:  · Early walking after surgery, as soon as your health care provider says that it is safe.  · Receiving anticoagulants to prevent blood clots. If you cannot take anticoagulants, other options may be available, such as wearing compression stockings or using different types of devices.  Get help right away if:  · You have new or increased pain, swelling, or redness in an arm or leg.  · You have numbness or tingling in an arm or leg.  · You have shortness of breath while active or at rest.  · You have chest pain.  · You have a rapid or irregular heartbeat.  · You feel light-headed or dizzy.  · You cough up blood.  · You notice blood in your vomit, bowel movement, or urine.  These symptoms may represent a serious problem that is an emergency. Do not wait to see if the symptoms will go away. Get medical help right away. Call your local emergency services (911 in the [place] Do not drive yourself to the hospital.   This information is not intended to replace advice given to you by your health care provider. Make sure you discuss any questions you have with your health care provider.  Document Released: 12/18/2006 Document Revised: 05/25/2017 Document Reviewed: 04/13/2016  [employer] Interactive Patient Education © 2017 [employer]    Rivaroxaban oral tablets  What is this medicine?  RIVAROXABAN (ri va LACY a ban) is an anticoagulant (blood  thinner). It is used to treat blood clots in the lungs or in the veins. It is also used after knee or hip surgeries to prevent blood clots. It is also used to lower the chance of stroke in people with a medical condition called atrial fibrillation.  This medicine may be used for other purposes; ask your health care provider or pharmacist if you have questions.  COMMON BRAND NAME(S): Xarelto, Xarelto Starter Pack  What should I tell my health care provider before I take this medicine?  They need to know if you have any of these conditions:  -bleeding disorders  -bleeding in the brain  -blood in your stools (black or tarry stools) or if you have blood in your vomit  -history of stomach bleeding  -kidney disease  -liver disease  -low blood counts, like low white cell, platelet, or red cell counts  -recent or planned spinal or epidural procedure  -take medicines that treat or prevent blood clots  -an unusual or allergic reaction to rivaroxaban, other medicines, foods, dyes, or preservatives  -pregnant or trying to get pregnant  -breast-feeding  How should I use this medicine?  Take this medicine by mouth with a glass of water. Follow the directions on the prescription label. Take your medicine at regular intervals. Do not take it more often than directed. Do not stop taking except on your doctor's advice. Stopping this medicine may increase your risk of a blood clot. Be sure to refill your prescription before you run out of medicine.  If you are taking this medicine after hip or knee replacement surgery, take it with or without food. If you are taking this medicine for atrial fibrillation, take it with your evening meal. If you are taking this medicine to treat blood clots, take it with food at the same time each day. If you are unable to swallow your tablet, you may crush the tablet and mix it in applesauce. Then, immediately eat the applesauce. You should eat more food right after you eat the applesauce containing the  crushed tablet.  Talk to your pediatrician regarding the use of this medicine in children. Special care may be needed.  Overdosage: If you think you have taken too much of this medicine contact a poison control center or emergency room at once.  NOTE: This medicine is only for you. Do not share this medicine with others.  What if I miss a dose?  If you take your medicine once a day and miss a dose, take the missed dose as soon as you remember. If you take your medicine twice a day and miss a dose, take the missed dose immediately. In this instance, 2 tablets may be taken at the same time. The next day you should take 1 tablet twice a day as directed.  What may interact with this medicine?  Do not take this medicine with any of the following medications:  -defibrotide  This medicine may also interact with the following medications:  -aspirin and aspirin-like medicines  -certain antibiotics like erythromycin, azithromycin, and clarithromycin  -certain medicines for fungal infections like ketoconazole and itraconazole  -certain medicines for irregular heart beat like amiodarone, quinidine, dronedarone  -certain medicines for seizures like carbamazepine, phenytoin  -certain medicines that treat or prevent blood clots like warfarin, enoxaparin, and dalteparin  -conivaptan  -diltiazem  -felodipine  -indinavir  -lopinavir; ritonavir  -NSAIDS, medicines for pain and inflammation, like ibuprofen or naproxen  -ranolazine  -rifampin  -ritonavir  -SNRIs, medicines for depression, like desvenlafaxine, duloxetine, levomilnacipran, venlafaxine  -SSRIs, medicines for depression, like citalopram, escitalopram, fluoxetine, fluvoxamine, paroxetine, sertraline  -[place]'s wort  -verapamil  This list may not describe all possible interactions. Give your health care provider a list of all the medicines, herbs, non-prescription drugs, or dietary supplements you use. Also tell them if you smoke, drink alcohol, or use illegal drugs. Some  items may interact with your medicine.  What should I watch for while using this medicine?  Visit your doctor or health care professional for regular checks on your progress.  Notify your doctor or health care professional and seek emergency treatment if you develop breathing problems; changes in vision; chest pain; severe, sudden headache; pain, swelling, warmth in the leg; trouble speaking; sudden numbness or weakness of the face, arm or leg. These can be signs that your condition has gotten worse.  If you are going to have surgery or other procedure, tell your doctor that you are taking this medicine.  What side effects may I notice from receiving this medicine?  Side effects that you should report to your doctor or health care professional as soon as possible:  -allergic reactions like skin rash, itching or hives, swelling of the face, lips, or tongue  -back pain  -redness, blistering, peeling or loosening of the skin, including inside the mouth  -signs and symptoms of bleeding such as bloody or black, tarry stools; red or dark-brown urine; spitting up blood or brown material that looks like coffee grounds; red spots on the skin; unusual bruising or bleeding from the eye, gums, or nose  Side effects that usually do not require medical attention (report to your doctor or health care professional if they continue or are bothersome):  -dizziness  -muscle pain  This list may not describe all possible side effects. Call your doctor for medical advice about side effects. You may report side effects to FDA at 6-444-FDA-8954.  Where should I keep my medicine?  Keep out of the reach of children.  Store at room temperature between 15 and 30 degrees C (59 and 86 degrees F). Throw away any unused medicine after the expiration date.  NOTE: This sheet is a summary. It may not cover all possible information. If you have questions about this medicine, talk to your doctor, pharmacist, or health care provider.  © 2018  [employer] (2017-09-06 16:29:33)    Omeprazole capsules (sprinkle caps) - Rx  What is this medicine?  OMEPRAZOLE (oh ME pray zol) prevents the production of acid in the stomach. It is used to treat gastroesophageal reflux disease (GERD), ulcers, certain bacteria in the stomach, inflammation of the esophagus, and Zollinger-Rodriguez Syndrome. It is also used to treat other conditions that cause too much stomach acid.  This medicine may be used for other purposes; ask your health care provider or pharmacist if you have questions.  COMMON BRAND NAME(S): Prilosec  What should I tell my health care provider before I take this medicine?  They need to know if you have any of these conditions:  -liver disease  -low levels of magnesium in the blood  -lupus  -an unusual or allergic reaction to omeprazole, other medicines, foods, dyes, or preservatives  -pregnant or trying to get pregnant  -breast-feeding  How should I use this medicine?  Take this medicine by mouth with a glass of water. Follow the directions on the prescription label. Do not crush, break or chew the capsules. They can be opened and the contents sprinkled on a small amount of applesauce or yogurt, given with fruit juices, or swallowed immediately with water. This medicine works best if taken on an empty stomach 30 to 60 minutes before breakfast. Take your doses at regular intervals. Do not take your medicine more often than directed.  Talk to your pediatrician regarding the use of this medicine in children. Special care may be needed.  Overdosage: If you think you have taken too much of this medicine contact a poison control center or emergency room at once.  NOTE: This medicine is only for you. Do not share this medicine with others.  What if I miss a dose?  If you miss a dose, take it as soon as you can. If it is almost time for your next dose, take only that dose. Do not take double or extra doses.  What may interact with this medicine?  Do not  take this medicine with any of the following medications:  -atazanavir  -clopidogrel  -nelfinavir  This medicine may also interact with the following medications:  -ampicillin  -certain medicines for anxiety or sleep  -certain medicines that treat or prevent blood clots like warfarin  -cyclosporine  -diazepam  -digoxin  -disulfiram  -diuretics  -iron salts  -methotrexate  -mycophenolate mofetil  -phenytoin  -prescription medicine for fungal or yeast infection like itraconazole, ketoconazole, voriconazole  -saquinavir  -tacrolimus  This list may not describe all possible interactions. Give your health care provider a list of all the medicines, herbs, non-prescription drugs, or dietary supplements you use. Also tell them if you smoke, drink alcohol, or use illegal drugs. Some items may interact with your medicine.  What should I watch for while using this medicine?  It can take several days before your stomach pain gets better. Check with your doctor or health care professional if your condition does not start to get better, or if it gets worse.  You may need blood work done while you are taking this medicine.  What side effects may I notice from receiving this medicine?  Side effects that you should report to your doctor or health care professional as soon as possible:  -allergic reactions like skin rash, itching or hives, swelling of the face, lips, or tongue  -bone, muscle or joint pain  -breathing problems  -chest pain or chest tightness  -dark yellow or brown urine  -dizziness  -fast, irregular heartbeat  -feeling faint or lightheaded  -fever or sore throat  -muscle spasm  -palpitations  -rash on cheeks or arms that gets worse in the sun  -redness, blistering, peeling or loosening of the skin, including inside the mouth  -seizures  -tremors  -unusual bleeding or bruising  -unusually weak or tired  -yellowing of the eyes or skin  Side effects that usually do not require medical attention (report to your doctor or  health care professional if they continue or are bothersome):  -constipation  -diarrhea  -dry mouth  -headache  -nausea  This list may not describe all possible side effects. Call your doctor for medical advice about side effects. You may report side effects to FDA at 8-450-FDA-5502.  Where should I keep my medicine?  Keep out of the reach of children.  Store at room temperature between 15 and 30 degrees C (59 and 86 degrees F). Protect from light and moisture. Throw away any unused medicine after the expiration date.  NOTE: This sheet is a summary. It may not cover all possible information. If you have questions about this medicine, talk to your doctor, pharmacist, or health care provider.  © 2018 [employer] (2017-01-19 12:18:47)      Depression / Suicide Risk    As you are discharged from this [facility] facility, it is important to learn how to keep safe from harming yourself.    Recognize the warning signs:  · Abrupt changes in personality, positive or negative- including increase in energy   · Giving away possessions  · Change in eating patterns- significant weight changes-  positive or negative  · Change in sleeping patterns- unable to sleep or sleeping all the time   · Unwillingness or inability to communicate  · Depression  · Unusual sadness, discouragement and loneliness  · Talk of wanting to die  · Neglect of personal appearance   · Rebelliousness- reckless behavior  · Withdrawal from people/activities they love  · Confusion- inability to concentrate     If you or a loved one observes any of these behaviors or has concerns about self-harm, here's what you can do:  · Talk about it- your feelings and reasons for harming yourself  · Remove any means that you might use to hurt yourself (examples: pills, rope, extension cords, firearm)  · Get professional help from the community (Mental Health, Substance Abuse, psychological counseling)  · Do not be alone:Call your Safe Contact- someone whom you  trust who will be there for you.  · Call your local CRISIS HOTLINE 319-8902 or 120-271-3825  · Call your local Children's Mobile Crisis Response Team [place] (694) 563-8878 or www.[employer]  · Call the toll free National Suicide Prevention Hotlines   · National Suicide Prevention Lifeline 768-343-CQTI (7384)  · National Periscape Line Network 800-SUICIDE (836-7422)

## 2019-06-01 NOTE — PROGRESS NOTES
Received report from Emilee SANTIAGO, at pt bedside. Pt has no complaints at this time, POC discussed. Call light and belongings within reach. Bed locked and in low position. Alarm on and fall precautions in place.

## 2019-06-02 NOTE — DISCHARGE SUMMARY
Discharge Summary    CHIEF COMPLAINT ON ADMISSION  Chief Complaint   Patient presents with   • Shortness of Breath   • Leg Pain       Reason for Admission  EMS     Admission Date  5/29/2019    CODE STATUS  Prior    HPI & HOSPITAL COURSE  This is a 54 years old male with past medical history of clotting disorder, history of PE and DVT who comes in with shortness of breath.  Patient has a history of massive PE in 2017 that required alteplase and hospitalization in the intensive care unit.  He was also found to have acute and subacute DVT on the right lower extremity.  He was then discharged on Xarelto which she took only for 1 month.  Few weeks ago he injured his left knee and was mainly staying in bed for 2 days as he stated.  He noted swelling on his left leg and then started having shortness of breath which worsened over the course of time.  Symptoms has worsened to the point he needed medical attention.  In the ER CT angiogram of the chest showed acute bilateral pulmonary emboli moderate to severe with saddle embolus present.  There was no evidence of right ventricular strain.  Doppler study of the lower extremities showed acute DVT of the left lower extremity.  Patient was started on heparin drip then switched to Xarelto.  Was found to have acute respiratory failure secondary to the bilateral PE.  Respiratory status improved the hospital course with oxygen requirements decreased.  Patient was successfully weaned off oxygen and was saturating well on room air.  Left leg swelling and pain has improved gradually over hospital course.  Patient was hemodynamic clinically stable.  Echocardiogram was unremarkable with no right heart strain noted.  Was able during the room down the [place].  He was cleared for discharge from medical standpoint.       Therefore, he is discharged in good and stable condition to home with close outpatient follow-up.    The patient met 2-midnight criteria for an inpatient stay at the time of  discharge.    Discharge Date  6/1/2019    FOLLOW UP ITEMS POST DISCHARGE  Follow-up with PCP in 1 week    DISCHARGE DIAGNOSES  Active Problems:    Pulmonary embolism (HCC) POA: Yes    Acute deep vein thrombosis (DVT) of femoral vein of left lower extremity (HCC) POA: Yes    Hyperglycemia POA: Yes    Nicotine abuse POA: Yes  Resolved Problems:    Acute respiratory failure with hypoxia (HCC) POA: Yes      FOLLOW UP  No future appointments.  PRIMARY CARE PROVIDER  KD OSPINA    PLease call to schedule a follow up appointment, [facility] is out of network for your insurance, thank you       MEDICATIONS ON DISCHARGE     Medication List      START taking these medications      Instructions   omeprazole 20 MG delayed-release capsule  Commonly known as:  PRILOSEC   Take 1 Cap by mouth every day.  Dose:  20 mg     * rivaroxaban 15 MG Tabs tablet  Commonly known as:  XARELTO   Take 1 Tab by mouth 2 times a day.  Dose:  15 mg     * rivaroxaban 20 MG Tabs tablet  Start taking on:  6/20/2019  Commonly known as:  XARELTO   Take 1 Tab by mouth with dinner.  Dose:  20 mg        * This list has 2 medication(s) that are the same as other medications prescribed for you. Read the directions carefully, and ask your doctor or other care provider to review them with you.                Allergies  No Known Allergies    DIET  No orders of the defined types were placed in this encounter.      ACTIVITY  As tolerated.  Weight bearing as tolerated    CONSULTATIONS  None    PROCEDURES  None    LABORATORY  Lab Results   Component Value Date    SODIUM 133 (L) 06/01/2019    POTASSIUM 4.4 06/01/2019    CHLORIDE 102 06/01/2019    CO2 25 06/01/2019    GLUCOSE 111 (H) 06/01/2019    BUN 17 06/01/2019    CREATININE 0.90 06/01/2019        Lab Results   Component Value Date    WBC 7.2 06/01/2019    HEMOGLOBIN 13.7 (L) 06/01/2019    HEMATOCRIT 41.0 (L) 06/01/2019    PLATELETCT 210 06/01/2019        Total time of the discharge process exceeds 40  minutes.

## 2019-11-03 ENCOUNTER — HOSPITAL ENCOUNTER (EMERGENCY)
Facility: MEDICAL CENTER | Age: 54
End: 2019-11-03
Attending: EMERGENCY MEDICINE
Payer: COMMERCIAL

## 2019-11-03 ENCOUNTER — APPOINTMENT (OUTPATIENT)
Dept: RADIOLOGY | Facility: MEDICAL CENTER | Age: 54
End: 2019-11-03
Attending: EMERGENCY MEDICINE
Payer: COMMERCIAL

## 2019-11-03 VITALS
HEIGHT: 78 IN | DIASTOLIC BLOOD PRESSURE: 89 MMHG | BODY MASS INDEX: 34.71 KG/M2 | SYSTOLIC BLOOD PRESSURE: 151 MMHG | HEART RATE: 92 BPM | TEMPERATURE: 98.2 F | OXYGEN SATURATION: 94 % | RESPIRATION RATE: 16 BRPM | WEIGHT: 300 LBS

## 2019-11-03 DIAGNOSIS — M79.672 LEFT FOOT PAIN: ICD-10-CM

## 2019-11-03 PROCEDURE — 93971 EXTREMITY STUDY: CPT | Mod: LT

## 2019-11-03 PROCEDURE — 99284 EMERGENCY DEPT VISIT MOD MDM: CPT

## 2019-11-03 PROCEDURE — 73630 X-RAY EXAM OF FOOT: CPT | Mod: LT

## 2019-11-03 RX ORDER — OXYCODONE HYDROCHLORIDE AND ACETAMINOPHEN 5; 325 MG/1; MG/1
1-2 TABLET ORAL EVERY 6 HOURS PRN
Qty: 15 TAB | Refills: 0 | Status: SHIPPED | OUTPATIENT
Start: 2019-11-03 | End: 2019-11-06

## 2019-11-03 ASSESSMENT — LIFESTYLE VARIABLES
DOES PATIENT WANT TO STOP DRINKING: NO
DO YOU DRINK ALCOHOL: NO
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
## 2019-12-06 ENCOUNTER — APPOINTMENT (OUTPATIENT)
Dept: RADIOLOGY | Facility: MEDICAL CENTER | Age: 54
End: 2019-12-06
Attending: EMERGENCY MEDICINE
Payer: COMMERCIAL

## 2019-12-06 ENCOUNTER — HOSPITAL ENCOUNTER (EMERGENCY)
Facility: MEDICAL CENTER | Age: 54
End: 2019-12-06
Attending: EMERGENCY MEDICINE
Payer: COMMERCIAL

## 2019-12-06 VITALS
DIASTOLIC BLOOD PRESSURE: 84 MMHG | BODY MASS INDEX: 34.18 KG/M2 | HEIGHT: 78 IN | RESPIRATION RATE: 16 BRPM | WEIGHT: 295.42 LBS | SYSTOLIC BLOOD PRESSURE: 121 MMHG | HEART RATE: 82 BPM | TEMPERATURE: 98.4 F | OXYGEN SATURATION: 95 %

## 2019-12-06 DIAGNOSIS — M79.89 FOOT SWELLING: ICD-10-CM

## 2019-12-06 DIAGNOSIS — S93.602A SPRAIN OF LEFT FOOT, INITIAL ENCOUNTER: ICD-10-CM

## 2019-12-06 LAB — EKG IMPRESSION: NORMAL

## 2019-12-06 PROCEDURE — 93971 EXTREMITY STUDY: CPT | Mod: LT

## 2019-12-06 PROCEDURE — 93971 EXTREMITY STUDY: CPT | Mod: 26,LT | Performed by: INTERNAL MEDICINE

## 2019-12-06 PROCEDURE — 99284 EMERGENCY DEPT VISIT MOD MDM: CPT

## 2019-12-06 PROCEDURE — 93005 ELECTROCARDIOGRAM TRACING: CPT | Performed by: EMERGENCY MEDICINE

## 2019-12-06 PROCEDURE — 73630 X-RAY EXAM OF FOOT: CPT | Mod: LT

## 2019-12-06 NOTE — ED TRIAGE NOTES
Pt amb to triage.  Chief Complaint   Patient presents with   • Foot Pain     left   • Foot Swelling   • T-5000 FALL     slip and fall on ice yesterday     Pt reports he broke the same foot 1mo ago, re-injured yesterday after a slip and fall. Taking Eliquis for PE and DVT.

## 2019-12-07 NOTE — ED PROVIDER NOTES
ED Provider Note    CHIEF COMPLAINT  Chief Complaint   Patient presents with   • Foot Pain     left   • Foot Swelling   • T-5000 FALL     slip and fall on ice yesterday       HPI  Thony Crow is a 54 y.o. male who presents for evaluation of left foot and leg swelling, he had an injury yesterday where he was walking on a slipped and thinks he injured his foot.  The last time this happened the patient experienced an acute DVT despite being anticoagulated on Xarelto, he was subsequently followed up with a hematologist and was switched to Eliquis and the patient expresses concern now that this injury yesterday has led to the possibility of another acute DVT.  No back pain or weakness or numbness or tingling or abdominal pain or any other injuries or complaints offered at this time.    REVIEW OF SYSTEMS  Negative for fever, rash, chest pain, dyspnea, abdominal pain, nausea, vomiting, diarrhea, headache, focal weakness, focal numbness, focal tingling, back pain. All other systems are negative.     PAST MEDICAL HISTORY  Past Medical History:   Diagnosis Date   • Anxiety    • Clotting disorder (HCC)    • Fracture    • Heart attack (HCC)    • Muscle disorder        FAMILY HISTORY  Family History   Problem Relation Age of Onset   • Diabetes Father        SOCIAL HISTORY  Social History     Tobacco Use   • Smoking status: Current Some Day Smoker     Types: Cigars   • Smokeless tobacco: Never Used   Substance Use Topics   • Alcohol use: No     Comment: rarely   • Drug use: Yes     Types: Inhaled     Comment: regular marijuana use       SURGICAL HISTORY  Past Surgical History:   Procedure Laterality Date   • OTHER ORTHOPEDIC SURGERY  2010    meniscus repair L knee   • OTHER ORTHOPEDIC SURGERY  1991    ACL repair R knee        CURRENT MEDICATIONS  I personally reviewed the medication list in the charting documentation.     ALLERGIES  No Known Allergies    MEDICAL RECORD  I have reviewed patient's medical record and pertinent  "results are listed above.      PHYSICAL EXAM  VITAL SIGNS: /84   Pulse 82   Temp 36.9 °C (98.4 °F) (Temporal)   Resp 16   Ht 2.007 m (6' 7\")   Wt (!) 134 kg (295 lb 6.7 oz)   SpO2 95%   BMI 33.28 kg/m²    Constitutional: Well appearing patient in no acute distress.  Not toxic, nor ill in appearance.  HENT: Mucus membranes moist.    Eyes: No scleral icterus. Normal conjunctiva   Neck: Supple, comfortable, nonpainful range of motion.   Cardiovascular: Regular heart rate and rhythm.   Thorax & Lungs: Chest is nontender.  Lungs are clear to auscultation with good air movement bilaterally.  No wheeze, rhonchi, nor rales.   Abdomen: Soft, with no tenderness, rebound nor guarding.  No mass or pulsatile mass appreciated.  Skin: Warm, dry. No rash appreciated  Extremities/Musculoskeletal: Obvious minimal asymmetric edema of the left lower extremity, tenderness of the calf.  He does have edema of the foot with normal sensation and capillary refill.  Neurologic: Alert & oriented. No focal deficits observed.   Psychiatric: Normal affect appropriate for the clinical situation.    DIAGNOSTIC STUDIES / PROCEDURES    RADIOLOGY  US-EXTREMITY VENOUS LOWER UNILAT LEFT   Final Result      DX-FOOT-COMPLETE 3+ LEFT   Final Result      No acute fracture identified.            COURSE & MEDICAL DECISION MAKING  I have reviewed any medical record information, laboratory studies and radiographic results as noted above.  Differential diagnoses includes: Sprain, fracture, DVT    Encounter Summary: This is a 54 y.o. male with left-sided foot pain and swelling with calf tenderness after an injury yesterday, has a recent history of acute DVT despite being on anticoagulation status post a similar injury so that really why is here.  He was switched from Xarelto to Eliquis by a hematologist.  On exam he does have some asymmetric findings but has known DVT, neurovascularly intact without deformity.  Obtain an x-ray to evaluate for the " possibility of osseous injury, will also obtain the duplex study to rule out acute DVT -----x-ray is negative.  The cardiologist has contacted me regarding the duplex study which is negative for acute DVT but does reveal chronic DVTs, patient is being discharged home in stable condition, has been instructed to continue his Eliquis and use the immobilization boot that he has at the bedside.      DISPOSITION: Discharged home in stable condition      FINAL IMPRESSION  1. Sprain of left foot, initial encounter    2. Foot swelling           This dictation was created using voice recognition software. The accuracy of the dictation is limited to the abilities of the software. I expect there may be some errors of grammar and possibly content. The nursing notes were reviewed and certain aspects of this information were incorporated into this note.    Electronically signed by: Edmundo Higuera, 12/6/2019 5:25 PM

## 2019-12-07 NOTE — ED NOTES
Pt updated on plan. Awaiting md. Pt reports pain in left foot and calf. Reports he feels there are clots in their and is unsure if he should continue zeb that hematology has him on
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
## 2022-05-06 NOTE — PROGRESS NOTES
Pulmonary Critical Care Progress Note        Date of service:  7/8/2017    Interval Events:  24 hour interval history reviewed.  Reason for visit:  Acute PE  Seen with [facility] Team      Xarelto started 2 days ago  Alert and oriented x4, complains of left-sided rib pain, received Oxy 10 without relief  SR  On room air now  Tolerating regular diet  Self-diuresing well  Toradol 30 IV q6 for pain relief      Improved SOB.  No hemoptysis.  Mild dry cough.  No wheezing.  Pleuritic CP on left lateral lower chest  No angina, palp, syncope  Right leg pain and edema are improved  No HA, Sz, diplopia  No N/V/P/D  No dysuria or hematuria  No rash    The complete AMA/CMS system review does not reveal additional positive findings.      PFSH:  No change.    Respiratory:     Pulse Oximetry: 94 %  CTA reviewed:  Large bilateral PE with right heart strain.  Clear lungs  RA  No dullness    HemoDynamics:  Pulse: 77  Blood Pressure: 120/62 mmHg     SR  No edema in right leg  Left leg normal    Recent Labs      07/06/17   0235  07/06/17   0850   TROPONINI  1.01*  0.33*       Neuro:  Awake and alert  No focal weakness  Fully oriented    Fluids:  Intake/Output       07/06/17 0700 - 07/07/17 0659 07/07/17 0700 - 07/08/17 0659 07/08/17 0700 - 07/09/17 0659      1967-3017 7090-5127 Total 2485-1932 9419-0819 Total 9310-6641 1948-5352 Total       Intake    P.O.  200  1350 1550  500  -- 500  826  -- 826    P.O. 200 1350 1550 500 -- 500 826 -- 826    I.V.  1192.8  22.1 1214.9  --  -- --  --  -- --    Heparin Volume 292.8 22.1 314.9 -- -- -- -- -- --    IV Volume (NS) 900 -- 900 -- -- -- -- -- --    Total Intake 1392.8 1372.1 2764.9 500 -- 500 826 -- 826       Output    Urine  1050  1400 2450  1400  -- 1400  --  -- --    Number of Times Voided 3 x 1 x 4 x 1 x -- 1 x -- -- --    Void (ml) 1050 1400 2450 1400 -- 1400 -- -- --    Total Output 1050 1400 2450 1400 -- 1400 -- -- --       Net I/O     342.8 -27.9 314.9 -900 -- -900 826 -- 826         Weight: (!) 134.6 kg (296 lb 11.8 oz)  Recent Labs      17   02317   SODIUM  141  138  138   POTASSIUM  3.9  3.9  4.4   CHLORIDE  113*  108  107   CO2  21  22  28   BUN  14  11  12   CREATININE  0.97  0.93  1.01   MAGNESIUM  2.0  1.9  2.1   CALCIUM  7.5*  7.8*  8.3*       GI/Nutrition:  Abd soft ND/NT  No N/V/P    Liver Function  Recent Labs      17   02317   ALTSGPT  24  21   --    ASTSGOT  21  15   --    ALKPHOSPHAT  63  66   --    TBILIRUBIN  0.6  0.5   --    GLUCOSE  95  89  124*       Heme:  Recent Labs      17   0850  17   1442  17   RBC  3.50*   --    --   3.48*  3.80*   HEMOGLOBIN  10.6*   --    --   10.5*  11.4*   HEMATOCRIT  32.3*   --    --   32.3*  34.7*   PLATELETCT  133*   --    --   149*  202   APTT  39.5*  37.7*  34.5  29.1   --        Infectious Disease:  Temp  Av.7 °C (98.1 °F)  Min: 36.3 °C (97.4 °F)  Max: 36.9 °C (98.4 °F)    Recent Labs      17   WBC  7.7  6.1  6.4   NEUTSPOLYS  56.60  59.50  63.40   LYMPHOCYTES  28.50  24.90  21.40*   MONOCYTES  8.90  7.60  6.60   EOSINOPHILS  4.60  6.60  7.20*   BASOPHILS  0.70  0.70  0.60   ASTSGOT  21  15   --    ALTSGPT  24  21   --    ALKPHOSPHAT  63  66   --    TBILIRUBIN  0.6  0.5   --      Current Facility-Administered Medications   Medication Dose Frequency Provider Last Rate Last Dose   • ketorolac (TORADOL) injection 30 mg  30 mg Q6HRS Aric Odonnell M.D.   30 mg at 176   • rivaroxaban (XARELTO) tablet 15 mg  15 mg BID Aric Odonnell M.D.   15 mg at 17    Followed by   • [START ON 2017] rivaroxaban (XARELTO) tablet 20 mg  20 mg DAILY Aric Odonnell M.D.       • senna-docusate (PERICOLACE or SENOKOT S) 8.6-50 MG per tablet 2 Tab  2 Tab BID Dione Hernandez M.D.   2 Tab at 17    And   •  polyethylene glycol/lytes (MIRALAX) PACKET 1 Packet  1 Packet QDAY PRN Dione Hernandez M.D.        And   • magnesium hydroxide (MILK OF MAGNESIA) suspension 30 mL  30 mL QDAY PRN Dione Hernandez M.D.        And   • bisacodyl (DULCOLAX) suppository 10 mg  10 mg QDAY PRN Dione Hernandez M.D.       • Respiratory Care per Protocol   Continuous RT Dione Hernandez M.D.       • acetaminophen (TYLENOL) tablet 650 mg  650 mg Q6HRS PRN Dione Hernandez M.D.       • oxycodone immediate-release (ROXICODONE) tablet 5 mg  5 mg Q4HRS PRJHONATAN Hernandez M.D.   5 mg at 07/08/17 1324    Or   • oxycodone immediate-release (ROXICODONE) tablet 10 mg  10 mg Q4HRS PRN Dione Hernandez M.D.   10 mg at 07/07/17 1238   • enalaprilat (VASOTEC) injection 1.25 mg  1.25 mg Q6HRS PRN Dione Hernandez M.D.       • ondansetron (ZOFRAN) syringe/vial injection 4 mg  4 mg Q4HRS PRJHONATAN Hernandez M.D.   4 mg at 07/07/17 2348   • ondansetron (ZOFRAN ODT) dispertab 4 mg  4 mg Q4HRS PRJHONATAN Hernandez M.D.       • promethazine (PHENERGAN) tablet 12.5-25 mg  12.5-25 mg Q4HRS PRJHONATAN Hernandez M.D.       • promethazine (PHENERGAN) suppository 12.5-25 mg  12.5-25 mg Q4HRS PRJHONATAN Hernandez M.D.       • prochlorperazine (COMPAZINE) injection 5-10 mg  5-10 mg Q4HRS PRJHONATAN Hernandez M.D.       • guaifenesin DM (ROBITUSSIN DM) 100-10 MG/5ML syrup 10 mL  10 mL Q6HRS PRN Dione Hernandez M.D.       • ipratropium-albuterol (DUONEB) nebulizer solution 3 mL  3 mL Q6HRS PRN (RT) Dione Hernandez M.D.         Last reviewed on 7/5/2017  4:58 PM by Terrence Alcocer    Quality  Measures:  Labs reviewed, Medications reviewed and Radiology images reviewed                        Assessment and Plan:    Acute hypoxemic respiratory failure   - resolving  Acute bilateral PE with right heart strain   - S/P alteplase   - cont rivaroxaban   - echocardiogram with  decreased RV function   - will give short course of Toradol for pleuritic chest pain  Acute RLE DVT - cont anticoagulation    It seems that the patient DVT/Pe was unprovoked. He probably need long term anticoagulation treatment     We will cont to follow        Benzoyl Peroxide Counseling: Patient counseled that medicine may cause skin irritation and bleach clothing.  In the event of skin irritation, the patient was advised to reduce the amount of the drug applied or use it less frequently.   The patient verbalized understanding of the proper use and possible adverse effects of benzoyl peroxide.  All of the patient's questions and concerns were addressed.